# Patient Record
Sex: MALE | Race: WHITE | NOT HISPANIC OR LATINO | Employment: FULL TIME | URBAN - METROPOLITAN AREA
[De-identification: names, ages, dates, MRNs, and addresses within clinical notes are randomized per-mention and may not be internally consistent; named-entity substitution may affect disease eponyms.]

---

## 2017-03-06 ENCOUNTER — SLEEP CENTER VISIT (OUTPATIENT)
Dept: SLEEP MEDICINE | Facility: MEDICAL CENTER | Age: 57
End: 2017-03-06
Payer: COMMERCIAL

## 2017-03-06 VITALS
RESPIRATION RATE: 15 BRPM | WEIGHT: 214 LBS | TEMPERATURE: 97.5 F | HEIGHT: 70 IN | HEART RATE: 58 BPM | BODY MASS INDEX: 30.64 KG/M2 | SYSTOLIC BLOOD PRESSURE: 120 MMHG | DIASTOLIC BLOOD PRESSURE: 80 MMHG

## 2017-03-06 DIAGNOSIS — G47.33 OSA (OBSTRUCTIVE SLEEP APNEA): ICD-10-CM

## 2017-03-06 PROCEDURE — 99213 OFFICE O/P EST LOW 20 MIN: CPT | Performed by: NURSE PRACTITIONER

## 2017-03-06 RX ORDER — MELOXICAM 15 MG/1
TABLET ORAL
COMMUNITY
Start: 2017-01-16 | End: 2019-06-28

## 2017-03-06 NOTE — PATIENT INSTRUCTIONS
1. Continue CPAP nightly  2. Clean mask and tubing weekly  3. Replace mask and supplies as insurance will allow, order for new supplies to [facility]  4. Follow-up annually

## 2017-03-06 NOTE — PROGRESS NOTES
Chief Complaint   Patient presents with   • Follow-Up     1Y         HPI: This patient is a 56 y.o. male, who presents for annual follow-up of CHASITY. PSG indicates moderate CHASITY with an AHI of 25.7, minimum saturation 83%. Titration study December 2014 showed successful titration to CPAP. Patient is compliant with CPAP 10-16 cm H2O. Compliance download indicates 100% compliance, average use 9 hours per night, AHI of 13. He tells me the only time he gets up at night is when his cat wakes him up. Prior download showed an AHI of 31. He has had 2 titration studies indicating adequate titration to CPAP. He continues to feel well. Denies daytime hypersomnolence or morning headaches. Mask and pressures are comfortable. He denies snoring, resuscitative gasps or snorts. He gets supplies regularly from [facility]. No significant changes to his health since he was last seen.    Past Medical History   Diagnosis Date   • Allergy    • Dyslipidemia    • Hypertension    • Sleep apnea    • Chronic pain syndrome        Social History   Substance Use Topics   • Smoking status: Never Smoker    • Smokeless tobacco: Not on file   • Alcohol Use: Not on file       Family History   Problem Relation Age of Onset   • Other Father      leukemia   • Colon Cancer Mother    • Hypertension Brother    • Other Brother      dyslipidemia       Current medications as of today   Current Outpatient Prescriptions   Medication Sig Dispense Refill   • meloxicam (MOBIC) 15 MG tablet      • clonazepam (KLONOPIN) 1 MG TABS Take 1 mg by mouth every day.       • simvastatin (ZOCOR) 10 MG TABS Take 10 mg by mouth every evening.       • hydrocodone/acetaminophen (NORCO)  MG TABS Take 1 Tab by mouth every 6 hours as needed for Mild Pain. No driving or operation of machinery after taking this medication. 15 Each 0   • verapamil SR (CALAN-SR) 240 MG TBCR Take 240 mg by mouth every day.     • celecoxib (CELEBREX) 100 MG CAPS Take 100 mg by mouth as needed.         No  "current facility-administered medications for this visit.       Allergies: Erythromycin; Other food; and Sudafed    Blood pressure 120/80, pulse 58, temperature 36.4 °C (97.5 °F), temperature source Temporal, resp. rate 15, height 1.778 m (5' 10\"), weight 97.07 kg (214 lb).      ROS:   Constitutional: Denies fevers, chills, night sweats, weight loss or fatigue  HEENT: Denies earache, difficulty hearing, tinnitus, nasal congestion, hoarseness  Cardiovascular: Denies chest pain, tightness, palpitations, orthopnea or edema  Respiratory: See HPI  Sleep: Denies daytime sleepiness, snoring, apneas, insomnia, morning headaches  GI: Denies heartburn, dysphagia, nausea, abdominal pain, diarrhea or constipation  : Denies frequent urination, hematuria, discharge or painful urination    Physical exam:   Appearance: Well-nourished, well-developed, in no acute distress  HEENT: Normocephalic, atraumatic, white sclera, PERRLA, oropharynx clear  Respiratory: no intercostal retractions or accessory muscle use   Lungs auscultation: Clear to auscultation bilaterally  Cardiovascular: Regular rate rhythm no murmurs, rubs or gallops  Gait: Normal  Digits: No clubbing, cyanosis  Motor: No focal deficits  Orientation: Oriented to time, person and place    Diagnosis:  1. CHASITY (obstructive sleep apnea)  DME MASK AND SUPPLIES   2. BMI 30.0-30.9,adult         Plan:      1. Continue CPAP nightly  2. Clean mask and tubing weekly  3. Replace mask and supplies as insurance will allow, order for new supplies to [facility]  4. Follow-up annually            "

## 2017-03-06 NOTE — MR AVS SNAPSHOT
"        Mere Monge   3/6/2017 8:20 AM   Sleep Center Visit   MRN: [id number]    Department:  [facility]   Dept Phone:  [phone]    Description:  Male : 1960   Provider:  SCHUYLER Nagel           Reason for Visit     Follow-Up 1Y      Allergies as of 3/6/2017     Allergen Noted Reactions    Erythromycin 2012   Vomiting    Other Food 10/17/2016       Walnuts      Sudafed [Pseudoephedrine Hcl] 2012   Palpitations    Palpitations.      Vital Signs     Blood Pressure Pulse Temperature Respirations Height Weight    120/80 mmHg 58 36.4 °C (97.5 °F) (Temporal) 15 1.778 m (5' 10\") 97.07 kg (214 lb)    Body Mass Index Smoking Status                30.71 kg/m2 Never Smoker           Basic Information     Date Of Birth Sex Race Ethnicity Preferred Language    1960 Male White Non- English      Your appointments     Mar 06, 2018  9:00 AM   Follow UP with SCHUYLER Nagel   [facility] (--)    [address]  [facility] [address]   [phone]              Health Maintenance        Date Due Completion Dates    COLONOSCOPY 2008 ---    IMM INFLUENZA (1) 2016 ---    IMM DTaP/Tdap/Td Vaccine (2 - Td) 10/26/2024 10/26/2014            Current Immunizations     Tdap Vaccine 10/26/2014  2:59 PM      Below and/or attached are the medications your provider expects you to take. Review all of your home medications and newly ordered medications with your provider and/or pharmacist. Follow medication instructions as directed by your provider and/or pharmacist. Please keep your medication list with you and share with your provider. Update the information when medications are discontinued, doses are changed, or new medications (including over-the-counter products) are added; and carry medication information at all times in the event of emergency situations     Allergies:  ERYTHROMYCIN - Vomiting     OTHER FOOD - (reactions " not documented)     SUDAFED - Palpitations               Medications  Valid as of: March 06, 2017 -  9:12 AM    Generic Name Brand Name Tablet Size Instructions for use    Celecoxib (Cap) CELEBREX 100 MG Take 100 mg by mouth as needed.          ClonazePAM (Tab) KLONOPIN 1 MG Take 1 mg by mouth every day.          Hydrocodone-Acetaminophen (Tab) NORCO  MG Take 1 Tab by mouth every 6 hours as needed for Mild Pain. No driving or operation of machinery after taking this medication.        Meloxicam (Tab) MOBIC 15 MG         Simvastatin (Tab) ZOCOR 10 MG Take 10 mg by mouth every evening.          Verapamil HCl (Tab CR) CALAN- MG Take 240 mg by mouth every day.        .                 Medicines prescribed today were sent to:     [facility] #[id number] - [place], [place] - [address]    [address] [place] [address]    Phone: [phone] Fax: [phone]    Open 24 Hours?: No      Medication refill instructions:       If your prescription bottle indicates you have medication refills left, it is not necessary to call your provider’s office. Please contact your pharmacy and they will refill your medication.    If your prescription bottle indicates you do not have any refills left, you may request refills at any time through one of the following ways: The online TouchPo Android POS system (except Urgent Care), by calling your provider’s office, or by asking your pharmacy to contact your provider’s office with a refill request. Medication refills are processed only during regular business hours and may not be available until the next business day. Your provider may request additional information or to have a follow-up visit with you prior to refilling your medication.   *Please Note: Medication refills are assigned a new Rx number when refilled electronically. Your pharmacy may indicate that no refills were authorized even though a new prescription for the same medication is available at the pharmacy.  Please request the medicine by name with the pharmacy before contacting your provider for a refill.           Portalarium Access Code: [email or web address]  Expires: 4/5/2017  9:12 AM    Portalarium  A secure, online tool to manage your health information     [employer]’s Portalarium® is a secure, online tool that connects you to your personalized health information from the privacy of your home -- day or night - making it very easy for you to manage your healthcare. Once the activation process is completed, you can even access your medical information using the Portalarium charline, which is available for free in the Apple Charline store or Google Play store.     Portalarium provides the following levels of access (as shown below):   My Chart Features   [facility] Doctor [facility]  Specialists [facility]  Urgent  Care [facility]  Primary Care  Doctor   Email your healthcare team securely and privately 24/7 X X X    Manage appointments: schedule your next appointment; view details of past/upcoming appointments X      Request prescription refills. X      View recent personal medical records, including lab and immunizations X X X X   View health record, including health history, allergies, medications X X X X   Read reports about your outpatient visits, procedures, consult and ER notes X X X X   See your discharge summary, which is a recap of your hospital and/or ER visit that includes your diagnosis, lab results, and care plan. X X       How to register for Portalarium:  1. Go to  [email or web address].  2. Click on the Sign Up Now box, which takes you to the New Member Sign Up page. You will need to provide the following information:  a. Enter your Portalarium Access Code exactly as it appears at the top of this page. (You will not need to use this code after you’ve completed the sign-up process. If you do not sign up before the expiration date, you must request a new code.)   b. Enter your date of birth.   c. Enter your home email address.    d. Click Submit, and follow the next screen’s instructions.  3. Create a ImmunotEGGt ID. This will be your [employer]ot[employer] login ID and cannot be changed, so think of one that is secure and easy to remember.  4. Create a [employer]ot[employer] password. You can change your password at any time.  5. Enter your Password Reset Question and Answer. This can be used at a later time if you forget your password.   6. Enter your e-mail address. This allows you to receive e-mail notifications when new information is available in [employer].  7. Click Sign Up. You can now view your health information.    For assistance activating your [employer] account, call [phone]

## 2018-03-08 ENCOUNTER — SLEEP CENTER VISIT (OUTPATIENT)
Dept: SLEEP MEDICINE | Facility: MEDICAL CENTER | Age: 58
End: 2018-03-08
Payer: COMMERCIAL

## 2018-03-08 VITALS
HEIGHT: 70 IN | DIASTOLIC BLOOD PRESSURE: 70 MMHG | HEART RATE: 56 BPM | BODY MASS INDEX: 31.5 KG/M2 | RESPIRATION RATE: 15 BRPM | OXYGEN SATURATION: 95 % | WEIGHT: 220 LBS | SYSTOLIC BLOOD PRESSURE: 128 MMHG

## 2018-03-08 DIAGNOSIS — G47.33 OSA (OBSTRUCTIVE SLEEP APNEA): ICD-10-CM

## 2018-03-08 PROCEDURE — 99213 OFFICE O/P EST LOW 20 MIN: CPT | Performed by: NURSE PRACTITIONER

## 2018-03-08 NOTE — PROGRESS NOTES
Chief Complaint   Patient presents with   • Follow-Up     ANNUAL          HPI: This patient is a 57 y.o. male, who presents for annual follow-up of obstructive sleep apnea with compliance download.     PSG indicates moderate obstructive sleep apnea with an AHI of 25.7, minimum oxygen saturation of 83 %. He has had 2 titrations in the past showing adequate treatment with CPAP, despite mildly elevated AHI on download. He is compliant with auto CPAP 10-16 cm H2O. Compliance download over the past 30 days indicates 100 % compliance, average use of 9 hours per night, AHI of 15. Mask and pressures are comfortable. He requires new supplies. He reports improved quality of sleep with CPAP. Denies EDS or a.m. Headache.    Denies changes to his health over the past year.    Past Medical History:   Diagnosis Date   • Allergy    • Chronic pain syndrome    • Dyslipidemia    • Hypertension    • Sleep apnea        Social History   Substance Use Topics   • Smoking status: Never Smoker   • Smokeless tobacco: Never Used   • Alcohol use Yes      Comment: VERY RARE       Family History   Problem Relation Age of Onset   • Other Father      leukemia   • Colon Cancer Mother    • Hypertension Brother    • Other Brother      dyslipidemia   • Sleep Apnea Neg Hx        Current medications as of today   Current Outpatient Prescriptions   Medication Sig Dispense Refill   • clonazepam (KLONOPIN) 1 MG TABS Take 1 mg by mouth every day.       • simvastatin (ZOCOR) 10 MG TABS Take 10 mg by mouth every evening.       • hydrocodone/acetaminophen (NORCO)  MG TABS Take 1 Tab by mouth every 6 hours as needed for Mild Pain. No driving or operation of machinery after taking this medication. 15 Each 0   • verapamil SR (CALAN-SR) 240 MG TBCR Take 240 mg by mouth every day.     • meloxicam (MOBIC) 15 MG tablet      • celecoxib (CELEBREX) 100 MG CAPS Take 100 mg by mouth as needed.         No current facility-administered medications for this visit.   "      Allergies: Erythromycin; Other food; and Sudafed [pseudoephedrine hcl]    Blood pressure 128/70, pulse (!) 56, resp. rate 15, height 1.778 m (5' 10\"), weight 99.8 kg (220 lb), SpO2 95 %.      ROS: As per HPI and otherwise negative if not stated.      Physical exam:   Constitutional: Well-nourished, well-developed, in no acute distress  Eyes: PERRL  Neck: supple, trachea midline  Respiratory: no intercostal retractions or accessory muscle use   Lungs auscultation: Clear to auscultation bilaterally  Cardiovascular: Regular rate rhythm no murmurs, rubs or gallops  Musculoskeletal: no clubbing or cyanosis  Skin: No rashes or lesions noted on exposed skin  Neuro: No focal deficit noted  Psychiatric: Oriented to time, person and place.     Diagnosis:  1. CHASITY (obstructive sleep apnea)  DME MASK AND SUPPLIES   2. BMI 30.0-30.9,adult         Plan:  1. Continue auto CPAP nightly  2. Order for new mask and supplies to [facility]  3. Clean mask & tubing weekly  4. Replace supplies as insurance will allow  5. Follow up annually, sooner if needed    "

## 2018-09-11 ENCOUNTER — TELEPHONE (OUTPATIENT)
Dept: SLEEP MEDICINE | Facility: MEDICAL CENTER | Age: 58
End: 2018-09-11

## 2018-09-11 DIAGNOSIS — G47.33 OSA (OBSTRUCTIVE SLEEP APNEA): ICD-10-CM

## 2018-09-11 NOTE — TELEPHONE ENCOUNTER
Pt was seen 03/08/18 and his AutoPAP is broken, please sign pended order for a new pap to be sent to [facility]ap

## 2019-01-17 ENCOUNTER — APPOINTMENT (RX ONLY)
Dept: URBAN - METROPOLITAN AREA CLINIC 35 | Facility: CLINIC | Age: 59
Setting detail: DERMATOLOGY
End: 2019-01-17

## 2019-01-17 DIAGNOSIS — D22 MELANOCYTIC NEVI: ICD-10-CM

## 2019-01-17 DIAGNOSIS — Z71.89 OTHER SPECIFIED COUNSELING: ICD-10-CM

## 2019-01-17 DIAGNOSIS — D18.0 HEMANGIOMA: ICD-10-CM

## 2019-01-17 DIAGNOSIS — L82.1 OTHER SEBORRHEIC KERATOSIS: ICD-10-CM

## 2019-01-17 DIAGNOSIS — L81.4 OTHER MELANIN HYPERPIGMENTATION: ICD-10-CM

## 2019-01-17 DIAGNOSIS — Q83.3 ACCESSORY NIPPLE: ICD-10-CM

## 2019-01-17 PROBLEM — D22.61 MELANOCYTIC NEVI OF RIGHT UPPER LIMB, INCLUDING SHOULDER: Status: ACTIVE | Noted: 2019-01-17

## 2019-01-17 PROBLEM — D18.01 HEMANGIOMA OF SKIN AND SUBCUTANEOUS TISSUE: Status: ACTIVE | Noted: 2019-01-17

## 2019-01-17 PROBLEM — I10 ESSENTIAL (PRIMARY) HYPERTENSION: Status: ACTIVE | Noted: 2019-01-17

## 2019-01-17 PROCEDURE — 99213 OFFICE O/P EST LOW 20 MIN: CPT

## 2019-01-17 PROCEDURE — ? COUNSELING

## 2019-01-17 ASSESSMENT — LOCATION ZONE DERM
LOCATION ZONE: ARM
LOCATION ZONE: TRUNK

## 2019-01-17 ASSESSMENT — LOCATION SIMPLE DESCRIPTION DERM
LOCATION SIMPLE: ABDOMEN
LOCATION SIMPLE: RIGHT SHOULDER

## 2019-01-17 ASSESSMENT — LOCATION DETAILED DESCRIPTION DERM
LOCATION DETAILED: RIGHT POSTERIOR SHOULDER
LOCATION DETAILED: RIGHT RIB CAGE

## 2019-05-02 ENCOUNTER — SLEEP CENTER VISIT (OUTPATIENT)
Dept: SLEEP MEDICINE | Facility: MEDICAL CENTER | Age: 59
End: 2019-05-02
Payer: COMMERCIAL

## 2019-05-02 VITALS
RESPIRATION RATE: 16 BRPM | HEIGHT: 70 IN | OXYGEN SATURATION: 95 % | SYSTOLIC BLOOD PRESSURE: 130 MMHG | DIASTOLIC BLOOD PRESSURE: 70 MMHG | BODY MASS INDEX: 31.04 KG/M2 | HEART RATE: 52 BPM | WEIGHT: 216.8 LBS

## 2019-05-02 DIAGNOSIS — R01.1 MURMUR, CARDIAC: ICD-10-CM

## 2019-05-02 DIAGNOSIS — G47.33 OSA (OBSTRUCTIVE SLEEP APNEA): ICD-10-CM

## 2019-05-02 DIAGNOSIS — R06.02 SOB (SHORTNESS OF BREATH): ICD-10-CM

## 2019-05-02 PROCEDURE — 99214 OFFICE O/P EST MOD 30 MIN: CPT | Performed by: NURSE PRACTITIONER

## 2019-05-02 ASSESSMENT — ENCOUNTER SYMPTOMS
EYE PAIN: 0
MUSCULOSKELETAL NEGATIVE: 1
WHEEZING: 0
NEUROLOGICAL NEGATIVE: 1
PSYCHIATRIC NEGATIVE: 1
GASTROINTESTINAL NEGATIVE: 1
CARDIOVASCULAR NEGATIVE: 1
BRUISES/BLEEDS EASILY: 0
CONSTITUTIONAL NEGATIVE: 1
HEMOPTYSIS: 0
SPUTUM PRODUCTION: 0
EYE DISCHARGE: 0
SHORTNESS OF BREATH: 1
COUGH: 0

## 2019-05-02 NOTE — PROGRESS NOTES
Chief Complaint   Patient presents with   • Apnea     Last Seen 03/08/18         HPI: This patient is a 58 y.o. male, who presents for annual follow-up of obstructive sleep apnea.      PSG 2010 indicates moderate obstructive sleep apnea with an AHI of 25.7, minimum oxygen saturation of 83 %. He has had 2 titrations in the past showing adequate treatment with CPAP, despite elevated AHI on download. His OPO showed adequate saturations on CPAP. He is compliant with auto CPAP 10-16 cm H2O.  His last titration was in 2014 and he has been hesitant to repeat this.  He did not take Ambien during his prior sleep studies and reports a poor quality.  Upon further review of that titration study in 2014 his sleep efficiency was reduced to 67% and he only achieved one brief period of REM.     His CPAP device broke in September and a new one was ordered.  He did not bring his compliance chip today.  He continues to feel well rested and benefits from therapy.  He request a prescription for supplies.  He brings me documentation today of his daily AHI since September 27, 2018 when he received his new CPAP machine.  Some nights his AHI is low in the 3-4 range.  This typically occurs when he is traveling at lower altitude such as [facility] and [facility].  Although he has had several nights in [place] with a low AHI.  Other nights shows an elevated AHI at the highest 37.  He denies any changes to medications he denies mask leak, he is using a full facemask, he denies any narcotics or alcohol use.  He continues to deny significant sleep symptoms.  He feels he falls asleep quickly stays asleep through the night, rarely gets up to use the bathroom.  Denies EDS or a.m. Headache.    His main complaint today is shortness of breath.  He became ill with a cold in November.  Since that time he says he never fully recovered.  He continues to feel short of breath and gets tired more quickly than he used to with minimal activity.  He is a  never smoker.  No history of COPD or asthma.  He has never used inhalers.    Past Medical History:   Diagnosis Date   • Allergy    • Chronic pain syndrome    • Dyslipidemia    • Hypertension    • Sleep apnea        Social History   Substance Use Topics   • Smoking status: Never Smoker   • Smokeless tobacco: Never Used   • Alcohol use Yes      Comment: VERY RARE       Family History   Problem Relation Age of Onset   • Other Father         leukemia   • Colon Cancer Mother    • Hypertension Brother    • Other Brother         dyslipidemia   • Sleep Apnea Neg Hx        Immunization History   Administered Date(s) Administered   • Influenza TIV (IM) 10/01/2017   • Tdap Vaccine 10/26/2014       Current medications as of today   Current Outpatient Prescriptions   Medication Sig Dispense Refill   • meloxicam (MOBIC) 15 MG tablet      • clonazepam (KLONOPIN) 1 MG TABS Take 1 mg by mouth every day.       • simvastatin (ZOCOR) 10 MG TABS Take 10 mg by mouth every evening.       • celecoxib (CELEBREX) 100 MG CAPS Take 100 mg by mouth as needed.       • hydrocodone/acetaminophen (NORCO)  MG TABS Take 1 Tab by mouth every 6 hours as needed for Mild Pain. No driving or operation of machinery after taking this medication. 15 Each 0   • verapamil SR (CALAN-SR) 240 MG TBCR Take 240 mg by mouth every day.       No current facility-administered medications for this visit.        Allergies: Erythromycin; Other food; and Sudafed [pseudoephedrine hcl]    There were no vitals taken for this visit.      Review of Systems   Constitutional: Negative.    HENT: Negative.    Eyes: Negative for pain and discharge.   Respiratory: Positive for shortness of breath. Negative for cough, hemoptysis, sputum production and wheezing.    Cardiovascular: Negative.    Gastrointestinal: Negative.    Musculoskeletal: Negative.    Skin: Negative.    Neurological: Negative.    Endo/Heme/Allergies: Negative for environmental allergies. Does not bruise/bleed  easily.   Psychiatric/Behavioral: Negative.        Physical Exam   Constitutional: He is oriented to person, place, and time and well-developed, well-nourished, and in no distress.   HENT:   Head: Normocephalic and atraumatic.   Eyes: Pupils are equal, round, and reactive to light.   Neck: Normal range of motion. Neck supple. No tracheal deviation present.   Cardiovascular: Normal rate, regular rhythm and normal heart sounds.    Pulmonary/Chest: Effort normal and breath sounds normal. No respiratory distress. He has no wheezes. He has no rales.   Musculoskeletal: Normal range of motion. He exhibits no edema.   Neurological: He is alert and oriented to person, place, and time.   Skin: Skin is warm and dry.   Psychiatric: Mood, memory, affect and judgment normal.       Diagnoses/Plan:    1. CHASITY (obstructive sleep apnea)  Patient's initial sleep study showed moderate sleep apnea.  He has been on CPAP therapy for many years.  His AHI remains elevated on some nights, normal and other nights.  Prior overnight oximetry showed adequate nocturnal saturations, I suspect this may have been on one of his better nights of sleep.  He continues to deny significant sleep symptoms.  For his cardiovascular health, I have reiterated the importance that his sleep apnea is adequately treated.  I have strongly recommended an additional titration study with the use of Ambien.  I suspect he is having increased events during REM sleep and may require either BiPAP or ASV therapy.  Alternatively, we discussed repeating overnight oximetry to verify saturations.  He is agreeable to this.  If there is evidence of desaturations he would be amenable to additional titration.  - Overnight Oximetry; Future    2. SOB (shortness of breath)  In regards to shortness of breath, symptoms have been ongoing since November.  He reports intermittent wheeze and exertional dyspnea.  No formal history of lung disease.  I recommended pulmonary function testing and  chest x-ray to rule out lung disease.  He may have some element of reactive airways disease since his URI in November.  I have also recommended echocardiogram to verify pulmonary pressures.  - PULMONARY FUNCTION TESTS -Test requested: Complete Pulmonary Function Test; Future  - DX-CHEST-2 VIEWS  - EC-ECHOCARDIOGRAM COMPLETE W/O CONT; Future    3. Murmur, cardiac  He has a history of cardiac murmur, prior echo in 2015 showed mild concentric left ventricular hypertrophy, normal LVEF, trace mitral regurg, RVSP consistent with mild pulmonary hypertension.  Pending updated echocardiogram consider referral to cardiology for further work-up of shortness of breath.  - EC-ECHOCARDIOGRAM COMPLETE W/O CONT; Future    I would like for him to follow-up with the pulmonary clinic to review PFTs, echocardiogram, overnight oximetry, and x-ray    This dictation was created using voice recognition software. The accuracy of the dictation is limited to the abilities of the software. I expect there may be some errors of grammar and possibly content.

## 2019-05-09 ENCOUNTER — HOME STUDY (OUTPATIENT)
Dept: SLEEP MEDICINE | Facility: MEDICAL CENTER | Age: 59
End: 2019-05-09
Attending: NURSE PRACTITIONER
Payer: COMMERCIAL

## 2019-05-09 ENCOUNTER — HOSPITAL ENCOUNTER (OUTPATIENT)
Dept: RADIOLOGY | Facility: MEDICAL CENTER | Age: 59
End: 2019-05-09
Attending: NURSE PRACTITIONER
Payer: COMMERCIAL

## 2019-05-09 DIAGNOSIS — G47.33 OSA (OBSTRUCTIVE SLEEP APNEA): ICD-10-CM

## 2019-05-09 PROCEDURE — 94762 N-INVAS EAR/PLS OXIMTRY CONT: CPT | Performed by: FAMILY MEDICINE

## 2019-05-09 PROCEDURE — 71046 X-RAY EXAM CHEST 2 VIEWS: CPT

## 2019-05-10 ENCOUNTER — PATIENT MESSAGE (OUTPATIENT)
Dept: SLEEP MEDICINE | Facility: MEDICAL CENTER | Age: 59
End: 2019-05-10

## 2019-05-10 DIAGNOSIS — R00.1 BRADYCARDIA: ICD-10-CM

## 2019-05-10 NOTE — PROCEDURES
Over Night Pulse Oximetry     Indication:To assess the efficacy of the current pressure.       Impression:   The study was done on CPAP 10-16 cm. The total recording time was 6 hrs 13 min. O2 Sat. irvin was 82% and mean O2 sat was 91 % and baseline O2 at 93%. O2 sat was below 88% for 6 min of the flow evaluation time. Oxygen Desaturation (>=3%) Index was elevated at 5.8/hr.      Recommendation:  O2 irvin seems like an artifact. Unremarkable OPO otherwise. Continue CPAP at current pressure.

## 2019-05-10 NOTE — TELEPHONE ENCOUNTER
From: Mere Monge  To: SCHUYLER Nagel  Sent: 5/10/2019 10:26 AM PDT  Subject: Test Result Question    Madeleine, I had a moderate number of 17.5 events per hour last night when I was wearing the recording Oximeter.

## 2019-05-28 ENCOUNTER — APPOINTMENT (OUTPATIENT)
Dept: PULMONOLOGY | Facility: HOSPICE | Age: 59
End: 2019-05-28
Payer: COMMERCIAL

## 2019-05-29 ENCOUNTER — HOSPITAL ENCOUNTER (OUTPATIENT)
Dept: CARDIOLOGY | Facility: MEDICAL CENTER | Age: 59
End: 2019-05-29
Attending: NURSE PRACTITIONER
Payer: COMMERCIAL

## 2019-05-29 DIAGNOSIS — R06.02 SOB (SHORTNESS OF BREATH): ICD-10-CM

## 2019-05-29 DIAGNOSIS — R01.1 MURMUR, CARDIAC: ICD-10-CM

## 2019-05-29 LAB
LV EJECT FRACT  99904: 70
LV EJECT FRACT MOD 2C 99903: 52.21
LV EJECT FRACT MOD 4C 99902: 79.21
LV EJECT FRACT MOD BP 99901: 67.65

## 2019-05-29 PROCEDURE — 93306 TTE W/DOPPLER COMPLETE: CPT

## 2019-05-29 PROCEDURE — 93306 TTE W/DOPPLER COMPLETE: CPT | Mod: 26 | Performed by: INTERNAL MEDICINE

## 2019-06-03 ENCOUNTER — OFFICE VISIT (OUTPATIENT)
Dept: PULMONOLOGY | Facility: HOSPICE | Age: 59
End: 2019-06-03
Payer: COMMERCIAL

## 2019-06-03 ENCOUNTER — NON-PROVIDER VISIT (OUTPATIENT)
Dept: PULMONOLOGY | Facility: HOSPICE | Age: 59
End: 2019-06-03
Attending: NURSE PRACTITIONER
Payer: COMMERCIAL

## 2019-06-03 VITALS
RESPIRATION RATE: 16 BRPM | BODY MASS INDEX: 31.7 KG/M2 | DIASTOLIC BLOOD PRESSURE: 60 MMHG | HEART RATE: 46 BPM | HEIGHT: 69 IN | SYSTOLIC BLOOD PRESSURE: 120 MMHG | OXYGEN SATURATION: 98 % | WEIGHT: 214 LBS

## 2019-06-03 VITALS — HEIGHT: 69 IN | BODY MASS INDEX: 31.7 KG/M2 | WEIGHT: 214 LBS

## 2019-06-03 DIAGNOSIS — R06.02 SOB (SHORTNESS OF BREATH): ICD-10-CM

## 2019-06-03 DIAGNOSIS — R00.1 BRADYCARDIA: ICD-10-CM

## 2019-06-03 DIAGNOSIS — G47.33 OSA (OBSTRUCTIVE SLEEP APNEA): ICD-10-CM

## 2019-06-03 DIAGNOSIS — J68.3 REACTIVE AIRWAYS DYSFUNCTION SYNDROME (HCC): ICD-10-CM

## 2019-06-03 PROCEDURE — 99214 OFFICE O/P EST MOD 30 MIN: CPT | Performed by: NURSE PRACTITIONER

## 2019-06-03 PROCEDURE — 94060 EVALUATION OF WHEEZING: CPT | Performed by: INTERNAL MEDICINE

## 2019-06-03 PROCEDURE — 94726 PLETHYSMOGRAPHY LUNG VOLUMES: CPT | Performed by: INTERNAL MEDICINE

## 2019-06-03 PROCEDURE — 94729 DIFFUSING CAPACITY: CPT | Performed by: INTERNAL MEDICINE

## 2019-06-03 RX ORDER — PREDNISONE 10 MG/1
TABLET ORAL
Qty: 18 TAB | Refills: 2 | Status: CANCELLED | OUTPATIENT
Start: 2019-06-03

## 2019-06-03 ASSESSMENT — ENCOUNTER SYMPTOMS
NEUROLOGICAL NEGATIVE: 1
MUSCULOSKELETAL NEGATIVE: 1
BRUISES/BLEEDS EASILY: 0
CONSTITUTIONAL NEGATIVE: 1
SHORTNESS OF BREATH: 1
COUGH: 0
HEMOPTYSIS: 0
PSYCHIATRIC NEGATIVE: 1
EYE PAIN: 0
CARDIOVASCULAR NEGATIVE: 1
SPUTUM PRODUCTION: 0
EYE DISCHARGE: 0
WHEEZING: 0
GASTROINTESTINAL NEGATIVE: 1

## 2019-06-03 ASSESSMENT — PULMONARY FUNCTION TESTS
FEV1/FVC_PERCENT_PREDICTED: 105
FEV1/FVC: 81
FEV1_PERCENT_PREDICTED: 89
FEV1/FVC_PERCENT_PREDICTED: 105
FVC: 3.92
FEV1/FVC_PERCENT_CHANGE: 200
FEV1/FVC_PERCENT_PREDICTED: 107
FVC: 4.06
FEV1_PERCENT_PREDICTED: 95
FVC_PERCENT_PREDICTED: 85
FEV1/FVC_PERCENT_PREDICTED: 108
FEV1/FVC_PERCENT_LLN: 63
FEV1/FVC: 81.03
FVC_LLN: 3.82
FEV1/FVC: 79
FEV1_PERCENT_CHANGE: 3
FVC_PERCENT_PREDICTED: 88
FVC_PREDICTED: 4.57
FEV1_PREDICTED: 3.45
FEV1: 3.1
FEV1/FVC_PERCENT_PREDICTED: 75
FEV1/FVC: 79
FEV1/FVC_PERCENT_CHANGE: 2
FEV1_PERCENT_CHANGE: 6
FEV1/FVC_PREDICTED: 75
FEV1_LLN: 2.88
FEV1: 3.29

## 2019-06-03 NOTE — PROGRESS NOTES
Chief Complaint   Patient presents with   • Asthma     Last Seen 05/02/19   • Results     PFT 06/03/19, CXR 05/09/19, Echo 05/29/19, CNOX 05/09/19         HPI: This patient is a 59 y.o. male, who presents for test results and follow-up SOB.  He is normally seen at the sleep clinic for history of CHASITY.  Please see my note dated May 2, 2019 for details.  His main complaint at the time of his last visit was shortness of breath with exertion.  He became ill with a cold in November.  Since that time he says he never fully recovered.  He continues to feel short of breath and gets tired more quickly than he used to with minimal activity.  He denies cough or wheeze.  Denies chest pain or pressure.  Shortness of breath is primarily with stairs.  He noticed decreased stamina when skiing this winter. he is a never smoker.  No history of COPD or asthma. He has never used inhalers.    Echocardiogram was unremarkable with the exception of mild pulmonary hypertension, LVEF of 70% and RVSP 30 mmHg.  Chest x-ray is unremarkable, lungs are clear.  PFTs today are normal with an FEV1 of 3.10 L 89% predicted, FEV1 FVC ratio 79, TLC 98, DLCO 126% predicted with no significant bronchodilator response.  Testing reviewed in detail with the patient.  Of note heart rate is 46 today.  He takes verapamil 240 mg daily for hypertension.  He says his heart heart rate always runs low.  Even when he is exercising he cannot get his heart rate above 70.    Past Medical History:   Diagnosis Date   • Allergy    • Chronic pain syndrome    • Dyslipidemia    • Hypertension    • Sleep apnea        Social History   Substance Use Topics   • Smoking status: Never Smoker   • Smokeless tobacco: Never Used   • Alcohol use Yes      Comment: VERY RARE       Family History   Problem Relation Age of Onset   • Other Father         leukemia   • Colon Cancer Mother    • Hypertension Brother    • Other Brother         dyslipidemia   • Sleep Apnea Neg Hx   "      Immunization History   Administered Date(s) Administered   • Influenza (IM) Preservative Free 10/08/2010, 10/19/2012   • Influenza Seasonal Injectable 10/19/2011, 09/21/2013, 10/14/2014   • Influenza TIV (IM) 10/01/2017   • Influenza Vaccine Quad Inj (Pf) 10/06/2015   • Influenza Vaccine Quad Inj (Preserved) 11/14/2016   • Tdap Vaccine 10/26/2014       Current medications as of today   Current Outpatient Prescriptions   Medication Sig Dispense Refill   • clonazepam (KLONOPIN) 1 MG TABS Take 1 mg by mouth every day.       • simvastatin (ZOCOR) 10 MG TABS Take 10 mg by mouth every evening.       • hydrocodone/acetaminophen (NORCO)  MG TABS Take 1 Tab by mouth every 6 hours as needed for Mild Pain. No driving or operation of machinery after taking this medication. 15 Each 0   • verapamil SR (CALAN-SR) 240 MG TBCR Take 240 mg by mouth every day.     • meloxicam (MOBIC) 15 MG tablet      • celecoxib (CELEBREX) 100 MG CAPS Take 100 mg by mouth as needed.         No current facility-administered medications for this visit.        Allergies: Erythromycin; Other food; and Sudafed [pseudoephedrine hcl]    Ht 1.753 m (5' 9\")   Wt 97.1 kg (214 lb)       Review of Systems   Constitutional: Negative.    HENT: Negative.    Eyes: Negative for pain and discharge.   Respiratory: Positive for shortness of breath. Negative for cough, hemoptysis, sputum production and wheezing.    Cardiovascular: Negative.    Gastrointestinal: Negative.    Musculoskeletal: Negative.    Skin: Negative.    Neurological: Negative.    Endo/Heme/Allergies: Negative for environmental allergies. Does not bruise/bleed easily.   Psychiatric/Behavioral: Negative.        Physical Exam   Constitutional: He is oriented to person, place, and time and well-developed, well-nourished, and in no distress.   HENT:   Head: Normocephalic and atraumatic.   Mouth/Throat: Oropharynx is clear and moist.   Eyes: Pupils are equal, round, and reactive to light. "   Neck: Normal range of motion. Neck supple. No tracheal deviation present.   Cardiovascular: Regular rhythm and normal heart sounds.  Exam reveals no friction rub.    No murmur heard.  Bradycardic   Pulmonary/Chest: Effort normal and breath sounds normal.   Musculoskeletal: Normal range of motion.   Neurological: He is alert and oriented to person, place, and time. Gait normal.   Skin: Skin is warm and dry.   Psychiatric: Mood, memory, affect and judgment normal.       Diagnoses/Plan:    Reviewed PFTs chest x-ray and echo with Dr. Tadeo.  He is in agreement with trial of albuterol.  Patient does not have significant lung disease.    1. SOB (shortness of breath)  Pulmonary function tests, chest x-ray and echocardiogram are otherwise unremarkable.  He is noted to have bradycardia and he reports this is been an ongoing issue.  Recommend Holter monitor and referral to cardiology for further evaluation.  I suspect his verapamil will need to be decreased.  Also recommended he discuss with his PCP.  - Holter Monitor Study; Future  - REFERRAL TO CARDIOLOGY    2. Bradycardia  - Holter Monitor Study; Future  - REFERRAL TO CARDIOLOGY    3. Reactive airways dysfunction syndrome (HCC)  Very mild bronchodilator response on PFTs.  We discussed trial of albuterol or prednisone taper.  We will hold off until further evaluation by cardiology as I suspect bradycardia is the culprit of SOB.    4. CHASITY (obstructive sleep apnea)  Continue CPAP therapy nightly.  Recent overnight oximetry shows adequate nocturnal saturations    Follow-up in 3 to 6 months, sooner if needed          This dictation was created using voice recognition software. The accuracy of the dictation is limited to the abilities of the software. I expect there may be some errors of grammar and possibly content.

## 2019-06-03 NOTE — PROCEDURES
Technician: KAREN Burgos    Technician Comment:  Good patient effort & cooperation.  The results of this test meet the ATS/ERS standards for acceptability & reproducibility.  Test was performed on the Flared3D Body Plethysmograph-Elite DX system.  Predicted values were [facility]-3 for spirometry, [facility] for DLCO, ITS for Lung Volumes.  The DLCO was uncorrected for Hgb.  A bronchodilator of Ventolin HFA -2puffs via spacer administered.  DLCO performed during dilation period.    1. Baseline spirometry demonstrates a normal  FEV1 and FVC. FEV1/FVC ratio is normal.    2. After administration of an inhaled bronchodilator there is 6% improvement in FEV1.    3. Lung volumes demonstrate a normal total lung capacity at 98% of predicted.    4. Gas exchange as estimated by DLCO is normal at 126% of predicted.    5. Airway resistance is mildly increased.      Impression:    This study demonstrates the presence of possible mild obstructive lung disease as evidenced by the minimal increase in FEV1 after an inhaled bronchodilator and the increased airway resistance.  Minimal reversibility is noted on the study.

## 2019-06-24 ENCOUNTER — TELEPHONE (OUTPATIENT)
Dept: CARDIOLOGY | Facility: MEDICAL CENTER | Age: 59
End: 2019-06-24

## 2019-06-24 NOTE — TELEPHONE ENCOUNTER
LVM for patient to call about upcoming appointment with [facility] 7-1-2019. I need to know if he has had a recent EKG, labs and if so where he had them done.

## 2019-06-28 ENCOUNTER — HOSPITAL ENCOUNTER (EMERGENCY)
Facility: MEDICAL CENTER | Age: 59
End: 2019-06-28
Attending: EMERGENCY MEDICINE
Payer: COMMERCIAL

## 2019-06-28 ENCOUNTER — APPOINTMENT (OUTPATIENT)
Dept: RADIOLOGY | Facility: MEDICAL CENTER | Age: 59
End: 2019-06-28
Attending: EMERGENCY MEDICINE
Payer: COMMERCIAL

## 2019-06-28 VITALS
HEIGHT: 69 IN | OXYGEN SATURATION: 96 % | TEMPERATURE: 98.1 F | DIASTOLIC BLOOD PRESSURE: 77 MMHG | BODY MASS INDEX: 31.54 KG/M2 | WEIGHT: 212.96 LBS | HEART RATE: 46 BPM | RESPIRATION RATE: 17 BRPM | SYSTOLIC BLOOD PRESSURE: 152 MMHG

## 2019-06-28 DIAGNOSIS — R10.32 LLQ PAIN: ICD-10-CM

## 2019-06-28 DIAGNOSIS — N20.1 URETEROLITHIASIS: ICD-10-CM

## 2019-06-28 DIAGNOSIS — R10.9 FLANK PAIN: ICD-10-CM

## 2019-06-28 DIAGNOSIS — R00.1 SINUS BRADYCARDIA: ICD-10-CM

## 2019-06-28 LAB
ALBUMIN SERPL BCP-MCNC: 4.1 G/DL (ref 3.2–4.9)
ALBUMIN/GLOB SERPL: 1.4 G/DL
ALP SERPL-CCNC: 91 U/L (ref 30–99)
ALT SERPL-CCNC: 42 U/L (ref 2–50)
ANION GAP SERPL CALC-SCNC: 8 MMOL/L (ref 0–11.9)
APPEARANCE UR: CLEAR
AST SERPL-CCNC: 35 U/L (ref 12–45)
BACTERIA #/AREA URNS HPF: ABNORMAL /HPF
BASOPHILS # BLD AUTO: 0.9 % (ref 0–1.8)
BASOPHILS # BLD: 0.06 K/UL (ref 0–0.12)
BILIRUB SERPL-MCNC: 0.7 MG/DL (ref 0.1–1.5)
BILIRUB UR QL STRIP.AUTO: NEGATIVE
BUN SERPL-MCNC: 15 MG/DL (ref 8–22)
CALCIUM SERPL-MCNC: 8.5 MG/DL (ref 8.4–10.2)
CHLORIDE SERPL-SCNC: 102 MMOL/L (ref 96–112)
CO2 SERPL-SCNC: 27 MMOL/L (ref 20–33)
COLOR UR: YELLOW
CREAT SERPL-MCNC: 1.11 MG/DL (ref 0.5–1.4)
EKG IMPRESSION: NORMAL
EOSINOPHIL # BLD AUTO: 0.23 K/UL (ref 0–0.51)
EOSINOPHIL NFR BLD: 3.5 % (ref 0–6.9)
ERYTHROCYTE [DISTWIDTH] IN BLOOD BY AUTOMATED COUNT: 40.6 FL (ref 35.9–50)
GLOBULIN SER CALC-MCNC: 2.9 G/DL (ref 1.9–3.5)
GLUCOSE SERPL-MCNC: 110 MG/DL (ref 65–99)
GLUCOSE UR STRIP.AUTO-MCNC: NEGATIVE MG/DL
HCT VFR BLD AUTO: 46.5 % (ref 42–52)
HGB BLD-MCNC: 15.4 G/DL (ref 14–18)
IMM GRANULOCYTES # BLD AUTO: 0.02 K/UL (ref 0–0.11)
IMM GRANULOCYTES NFR BLD AUTO: 0.3 % (ref 0–0.9)
KETONES UR STRIP.AUTO-MCNC: NEGATIVE MG/DL
LEUKOCYTE ESTERASE UR QL STRIP.AUTO: NEGATIVE
LIPASE SERPL-CCNC: 26 U/L (ref 7–58)
LYMPHOCYTES # BLD AUTO: 2.06 K/UL (ref 1–4.8)
LYMPHOCYTES NFR BLD: 31.3 % (ref 22–41)
MCH RBC QN AUTO: 28.9 PG (ref 27–33)
MCHC RBC AUTO-ENTMCNC: 33.1 G/DL (ref 33.7–35.3)
MCV RBC AUTO: 87.4 FL (ref 81.4–97.8)
MICRO URNS: ABNORMAL
MONOCYTES # BLD AUTO: 0.58 K/UL (ref 0–0.85)
MONOCYTES NFR BLD AUTO: 8.8 % (ref 0–13.4)
MUCOUS THREADS #/AREA URNS HPF: ABNORMAL /HPF
NEUTROPHILS # BLD AUTO: 3.63 K/UL (ref 1.82–7.42)
NEUTROPHILS NFR BLD: 55.2 % (ref 44–72)
NITRITE UR QL STRIP.AUTO: NEGATIVE
NRBC # BLD AUTO: 0 K/UL
NRBC BLD-RTO: 0 /100 WBC
PH UR STRIP.AUTO: 7.5 [PH]
PLATELET # BLD AUTO: 194 K/UL (ref 164–446)
PMV BLD AUTO: 10.1 FL (ref 9–12.9)
POTASSIUM SERPL-SCNC: 3.7 MMOL/L (ref 3.6–5.5)
PROT SERPL-MCNC: 7 G/DL (ref 6–8.2)
PROT UR QL STRIP: NEGATIVE MG/DL
RBC # BLD AUTO: 5.32 M/UL (ref 4.7–6.1)
RBC # URNS HPF: ABNORMAL /HPF
RBC UR QL AUTO: ABNORMAL
SODIUM SERPL-SCNC: 137 MMOL/L (ref 135–145)
SP GR UR STRIP.AUTO: 1.01
WBC # BLD AUTO: 6.6 K/UL (ref 4.8–10.8)

## 2019-06-28 PROCEDURE — 83690 ASSAY OF LIPASE: CPT

## 2019-06-28 PROCEDURE — 74176 CT ABD & PELVIS W/O CONTRAST: CPT

## 2019-06-28 PROCEDURE — 96374 THER/PROPH/DIAG INJ IV PUSH: CPT

## 2019-06-28 PROCEDURE — 700111 HCHG RX REV CODE 636 W/ 250 OVERRIDE (IP): Performed by: EMERGENCY MEDICINE

## 2019-06-28 PROCEDURE — 99285 EMERGENCY DEPT VISIT HI MDM: CPT

## 2019-06-28 PROCEDURE — 85025 COMPLETE CBC W/AUTO DIFF WBC: CPT

## 2019-06-28 PROCEDURE — 80053 COMPREHEN METABOLIC PANEL: CPT

## 2019-06-28 PROCEDURE — 81001 URINALYSIS AUTO W/SCOPE: CPT

## 2019-06-28 PROCEDURE — 93005 ELECTROCARDIOGRAM TRACING: CPT

## 2019-06-28 PROCEDURE — 36415 COLL VENOUS BLD VENIPUNCTURE: CPT

## 2019-06-28 RX ORDER — MORPHINE SULFATE 15 MG/1
15 TABLET, FILM COATED, EXTENDED RELEASE ORAL 3 TIMES DAILY
COMMUNITY

## 2019-06-28 RX ORDER — KETOROLAC TROMETHAMINE 30 MG/ML
30 INJECTION, SOLUTION INTRAMUSCULAR; INTRAVENOUS ONCE
Status: COMPLETED | OUTPATIENT
Start: 2019-06-28 | End: 2019-06-28

## 2019-06-28 RX ORDER — TAMSULOSIN HYDROCHLORIDE 0.4 MG/1
0.4 CAPSULE ORAL DAILY
Qty: 5 CAP | Refills: 0 | Status: SHIPPED | OUTPATIENT
Start: 2019-06-28 | End: 2019-07-03

## 2019-06-28 RX ORDER — IBUPROFEN 800 MG/1
800 TABLET ORAL EVERY 8 HOURS PRN
Qty: 30 TAB | Refills: 0 | Status: SHIPPED | OUTPATIENT
Start: 2019-06-28 | End: 2023-09-15

## 2019-06-28 RX ADMIN — KETOROLAC TROMETHAMINE 30 MG: 30 INJECTION, SOLUTION INTRAMUSCULAR at 07:39

## 2019-06-28 ASSESSMENT — ENCOUNTER SYMPTOMS
HEADACHES: 0
VOMITING: 0
BACK PAIN: 1
CONSTIPATION: 0
FLANK PAIN: 1
SORE THROAT: 1
CHILLS: 0
ABDOMINAL PAIN: 1
NAUSEA: 0
DIARRHEA: 0
FEVER: 0
SHORTNESS OF BREATH: 0

## 2019-06-28 ASSESSMENT — PAIN SCALES - WONG BAKER: WONGBAKER_NUMERICALRESPONSE: HURTS A WHOLE LOT

## 2019-06-28 ASSESSMENT — PAIN DESCRIPTION - DESCRIPTORS
DESCRIPTORS: STABBING;SHARP
DESCRIPTORS: SHARP;STABBING

## 2019-06-28 NOTE — ED PROVIDER NOTES
"ED Provider Note    ED Provider Note    Primary care provider: Nacho Arrieta M.D.  Means of arrival: POV  History obtained from: Patient  History limited by: None    CHIEF COMPLAINT  Chief Complaint   Patient presents with   • Flank Pain     Left sided   • LLQ Pain       HPI  Mere Monge is a 59 y.o. male who presents to the Emergency Department with a chief complaint of left lower quadrant pain that radiates to his left flank.  It started suddenly at 530 this morning.  He felt well yesterday in the previous few days.  He denies any nausea, vomiting or diarrhea.  No fever.  No chest pain or shortness of breath.  No pain with inspiration.  No urinary symptoms.  No hematuria.  States is been dealing with a \"sore throat for a few weeks\".  Is a history of both kidney stones and diverticulitis, both of these are remote.  Diverticulitis was about 15 years ago and his kidney stone was about 20 years ago.  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  Norco 10/325 as well 50 mg of morphine regularly.  He also has a history of bradycardia.  He is followed by pulmonary and has an appointment with cardiology on Monday.  His bradycardia currently, is thought to be secondary to his calcium channel blocker.  He is recently had pulmonary function test as well as an echocardiogram and a chest x-ray which he states he \"passed all of them\".    REVIEW OF SYSTEMS  Review of Systems   Constitutional: Negative for chills and fever.   HENT: Positive for sore throat. Negative for congestion.    Respiratory: Negative for shortness of breath.    Cardiovascular: Negative for chest pain.   Gastrointestinal: Positive for abdominal pain. Negative for constipation, diarrhea, nausea and vomiting.   Genitourinary: Positive for flank pain. Negative for dysuria and hematuria.   Musculoskeletal: Positive for back pain.   Neurological: Negative for headaches.   All other systems reviewed and are negative.      PAST MEDICAL HISTORY   " "has a past medical history of Allergy; Chronic back pain; Chronic pain syndrome; Diverticulitis; Dyslipidemia; Hypertension; Kidney stones; Reactive airways dysfunction syndrome (HCC) (6/3/2019); and Sleep apnea.    SURGICAL HISTORY   has a past surgical history that includes other and other.    SOCIAL HISTORY  Social History   Substance Use Topics   • Smoking status: Never Smoker   • Smokeless tobacco: Never Used   • Alcohol use Yes      Comment: VERY RARE      History   Drug Use No       FAMILY HISTORY  Family History   Problem Relation Age of Onset   • Other Father         leukemia   • Colon Cancer Mother    • Hypertension Brother    • Other Brother         dyslipidemia   • Sleep Apnea Neg Hx        CURRENT MEDICATIONS  Home Medications     Reviewed by Luis Fernando Santoyo R.N. (Registered Nurse) on 06/28/19 at 0701  Med List Status: Complete   Medication Last Dose Status   hydrocodone/acetaminophen (NORCO)  MG TABS 6/28/2019 Active   morphine ER (MS CONTIN) 15 MG Tab CR tablet  Active   simvastatin (ZOCOR) 10 MG TABS 6/27/2019 Active   verapamil SR (CALAN-SR) 240 MG TBCR 6/27/2019 Active                ALLERGIES  Allergies   Allergen Reactions   • Erythromycin Vomiting   • Other Food      Walnuts     • Sudafed [Pseudoephedrine Hcl] Palpitations     Palpitations.       PHYSICAL EXAM  VITAL SIGNS: /77   Pulse (!) 45   Temp 36.7 °C (98.1 °F) (Temporal)   Resp 17   Ht 1.753 m (5' 9\")   Wt 96.6 kg (212 lb 15.4 oz)   SpO2 95%   BMI 31.45 kg/m²   Vitals reviewed.  Constitutional: Patient is oriented to person, place, and time. Appears well-developed and well-nourished. Mild distress.    Head: Normocephalic and atraumatic.   Ears: Normal external ears bilaterally.   Mouth/Throat: Oropharynx is clear and moist, no exudates.   Eyes: Conjunctivae are normal.   Neck: Normal range of motion. Neck supple.  Cardiovascular: Normal rate, regular rhythm and normal heart sounds. Normal peripheral pulses, Bilateral " UE.  Pulmonary/Chest: Effort normal and breath sounds normal. No respiratory distress, no wheezes, rhonchi, or rales. No chest wall tenderness.  Abdominal: Soft. Bowel sounds are normal. There is LLQ tenderness. No rebound or guarding, or peritoneal signs. Radiation to left CVA. NO CVA tenderness on palp.  Musculoskeletal: No edema and no tenderness.   Neurological: No focal deficits.   Skin: Skin is warm and dry. No erythema. No pallor.   Psychiatric: Patient has a normal mood and affect.     LABS  Results for orders placed or performed during the hospital encounter of 06/28/19   CBC WITH DIFFERENTIAL   Result Value Ref Range    WBC 6.6 4.8 - 10.8 K/uL    RBC 5.32 4.70 - 6.10 M/uL    Hemoglobin 15.4 14.0 - 18.0 g/dL    Hematocrit 46.5 42.0 - 52.0 %    MCV 87.4 81.4 - 97.8 fL    MCH 28.9 27.0 - 33.0 pg    MCHC 33.1 (L) 33.7 - 35.3 g/dL    RDW 40.6 35.9 - 50.0 fL    Platelet Count 194 164 - 446 K/uL    MPV 10.1 9.0 - 12.9 fL    Neutrophils-Polys 55.20 44.00 - 72.00 %    Lymphocytes 31.30 22.00 - 41.00 %    Monocytes 8.80 0.00 - 13.40 %    Eosinophils 3.50 0.00 - 6.90 %    Basophils 0.90 0.00 - 1.80 %    Immature Granulocytes 0.30 0.00 - 0.90 %    Nucleated RBC 0.00 /100 WBC    Neutrophils (Absolute) 3.63 1.82 - 7.42 K/uL    Lymphs (Absolute) 2.06 1.00 - 4.80 K/uL    Monos (Absolute) 0.58 0.00 - 0.85 K/uL    Eos (Absolute) 0.23 0.00 - 0.51 K/uL    Baso (Absolute) 0.06 0.00 - 0.12 K/uL    Immature Granulocytes (abs) 0.02 0.00 - 0.11 K/uL    NRBC (Absolute) 0.00 K/uL   COMP METABOLIC PANEL   Result Value Ref Range    Sodium 137 135 - 145 mmol/L    Potassium 3.7 3.6 - 5.5 mmol/L    Chloride 102 96 - 112 mmol/L    Co2 27 20 - 33 mmol/L    Anion Gap 8.0 0.0 - 11.9    Glucose 110 (H) 65 - 99 mg/dL    Bun 15 8 - 22 mg/dL    Creatinine 1.11 0.50 - 1.40 mg/dL    Calcium 8.5 8.4 - 10.2 mg/dL    AST(SGOT) 35 12 - 45 U/L    ALT(SGPT) 42 2 - 50 U/L    Alkaline Phosphatase 91 30 - 99 U/L    Total Bilirubin 0.7 0.1 - 1.5 mg/dL     Albumin 4.1 3.2 - 4.9 g/dL    Total Protein 7.0 6.0 - 8.2 g/dL    Globulin 2.9 1.9 - 3.5 g/dL    A-G Ratio 1.4 g/dL   LIPASE   Result Value Ref Range    Lipase 26 7 - 58 U/L   URINALYSIS CULTURE, IF INDICATED   Result Value Ref Range    Color Yellow     Character Clear     Specific Gravity 1.010 <1.035    Ph 7.5 5.0 - 8.0    Glucose Negative Negative mg/dL    Ketones Negative Negative mg/dL    Protein Negative Negative mg/dL    Bilirubin Negative Negative    Nitrite Negative Negative    Leukocyte Esterase Negative Negative    Occult Blood Large (A) Negative    Micro Urine Req Microscopic    URINE MICROSCOPIC (W/UA)   Result Value Ref Range    RBC 20-50 (A) /hpf    Bacteria Rare (A) None /hpf    Mucous Threads Moderate /hpf   ESTIMATED GFR   Result Value Ref Range    GFR If African American >60 >60 mL/min/1.73 m 2    GFR If Non African American >60 >60 mL/min/1.73 m 2       All labs reviewed by me.    EKG Interpretation  Interpreted by me    Rhythm: Sinus bradycardia  Rate: 47  Axis: normal  Ectopy: none  Conduction: normal  ST Segments: no acute change  T Waves: no acute change  Q Waves: none    Clinical Impression: No old EKG for evaluation.  Sinus bradycardia.  Otherwise no acute morphology changes.    RADIOLOGY  CT-RENAL COLIC EVALUATION(A/P W/O)   Final Result         1.  Mild left hydronephrosis secondary to single 4.5 mm stone in the distal ureter at the level of the left UVJ.      2.  No residual left nephrolithiasis.      3.  No right hydronephrosis or right nephrolithiasis.      4.  No inflammatory change of the bowel.        The radiologist's interpretation of all radiological studies have been reviewed by me.    COURSE & MEDICAL DECISION MAKING  Pertinent Labs & Imaging studies reviewed. (See chart for details)    Obtained and reviewed past medical records.  Patient's last encounter was in outpatient visit, follow-up with pulmonary.  She was seen for asthma.  Noted to have an echo in May of this year,  which was unremarkable.  EF noted to be 70%.  Pulmonary function studies and on Mayelin 3 of this year.  He has a history of obstructive sleep apnea.  Heart rate on that visit was 46.  Patient takes verapamil 240 mg daily for hypertension.  He reported at that time, that his heart rate is always low.  States that even when exercising, he cannot get his heart rate above 70.  No prior ED encounters or admissions in our EMR going back to 2012.    7:01 AM - Patient seen and examined at bedside.  This is a very pleasant 59-year-old male who presents with his wife with left lower quadrant pain and left flank pain.  Patient's pain started sudden in onset.  No associated GI symptoms such as nausea, vomiting or diarrhea, no hematuria.  He is bradycardic which is baseline in the process of being evaluated.  At this point, thought to be secondary to his check calcium channel blocker.  He will be treated with Toradol.  IV established, labs drawn per nursing protocols.  CT ordered for evaluation of etiology of pain.  Patient will be kept n.p.o. at this time.      The differential diagnoses include but are not limited to: Kidney stone, pyelonephritis, diverticulitis, dehydration    8:16 AM, patient's reevaluated the bedside.  He sitting up, texting on his smart phone.  No distress.  He reports feeling better.  We discussed CT findings consistent with a left-sided 4.5 mm UVJ stone.  No evidence of urinary tract infection.  Kidney function and labs are overall unrevealing.  I discussed follow-up with Dr. Ross is on-call for urology.  I suggested calling today to schedule an appointment early next week.  We discussed drinking plenty of fluid.  Patient already on Norco and morphine at home and I have advised I would not add additional opioids to this regiment.  I have suggested anti-inflammatories and he will be prescribed Flomax as well as Motrin.  Patient is well-appearing and nontoxic.  He will keep his scheduled follow-up  appointment next week with cardiology for continued evaluation of his bradycardia.      Patient's discharged home in stable condition.    FINAL IMPRESSION  1. Ureterolithiasis    2. Flank pain    3. LLQ pain    4. Sinus bradycardia

## 2019-06-28 NOTE — ED NOTES
0726:  PIV placed in RAC, blood drawn and sent to lab.  Pt and family member updated on POC including pending tests.  0739:  Pt medicated as ordered.  Spouse provided w/ warm blankets.  0745:  Pt to CT via [facility].

## 2019-06-28 NOTE — ED NOTES
Reviewed discharge instructions and printed prescriptions x 2 w/ pt and family member, verbalized understanding to information provided including follow up w/ Urology and return precautions.  Pt and family member denied questions/concerns.  Pt ambulated from ED w/ spouse.

## 2019-06-28 NOTE — ED TRIAGE NOTES
Pt ambulates to room w/ steady gait. A & O, privacy, gowned, monitored, plan of care & support given.  Wife at bedside.    Pt states he woke to go to BR and noticed L flank/LLQ pain. Pt states nothing improves the pain and states it is gradually getting worse. 7-8/10.    Pt states +bladder / bowel movements this morning.    Last oral intake- 1900 last noc and water this morning at 6.    Hx of kidney stones and diverticulitis. Pt states the pain feels like either one of those experienced in the past.

## 2019-07-01 ENCOUNTER — OFFICE VISIT (OUTPATIENT)
Dept: CARDIOLOGY | Facility: MEDICAL CENTER | Age: 59
End: 2019-07-01
Payer: COMMERCIAL

## 2019-07-01 VITALS
WEIGHT: 214.51 LBS | OXYGEN SATURATION: 95 % | HEIGHT: 69 IN | SYSTOLIC BLOOD PRESSURE: 132 MMHG | HEART RATE: 50 BPM | BODY MASS INDEX: 31.77 KG/M2 | DIASTOLIC BLOOD PRESSURE: 78 MMHG

## 2019-07-01 DIAGNOSIS — R06.02 SHORTNESS OF BREATH: ICD-10-CM

## 2019-07-01 PROCEDURE — 99204 OFFICE O/P NEW MOD 45 MIN: CPT | Performed by: INTERNAL MEDICINE

## 2019-07-01 RX ORDER — AMLODIPINE BESYLATE 10 MG/1
10 TABLET ORAL DAILY
Qty: 30 TAB | Refills: 11 | Status: SHIPPED | OUTPATIENT
Start: 2019-07-01 | End: 2020-05-01

## 2019-07-01 NOTE — LETTER
[facility]   [address]  [place] [address]  Phone: [phone]  Fax: [phone]              Mere Monge  1960    Encounter Date: 7/1/2019    Carlos Gage M.D.          PROGRESS NOTE:      Cardiology Initial Consultation Note    Date of note:    7/1/2019    Primary Care Provider: Nacho Arrieta M.D.  Referring Provider: Madeleine Kiser A*     Patient Name: Mere Monge   YOB: 1960  MRN:              [id number]    Chief Complaint: Fatigue, dyspnea on exertion    Mere Monge is a 59 y.o. male  patient presented today with fatigue, dyspnea on exertion.  Insidious in onset, stable for the last 9 months.  He is very active and skies but notices shortness of breath with exertion.  This was evaluated by his primary care physician.  Echocardiogram which was normal, EKG shows his heart rate in the 50s.  He is scheduled to have a Holter monitoring, referred here.  Denies chest pain, no family history of coronary artery disease.    ROS   Recent episode of left flank pain, currently better.  All other systems reviewed and discussed using a comprehensive questionnaire and are negative.     Past medical history, family history, social history, allergies and labs are reviewed and updated as needed as documented below.    Past Medical History:   Diagnosis Date   • Allergy    • Chronic back pain    • Chronic pain syndrome    • Diverticulitis    • Dyslipidemia    • Hypertension    • Kidney stones    • Reactive airways dysfunction syndrome (HCC) 6/3/2019   • Sleep apnea          Past Surgical History:   Procedure Laterality Date   • OTHER      deviated septum repair   • OTHER      L5-S1 surgery         Current Outpatient Prescriptions   Medication Sig Dispense Refill   • amLODIPine (NORVASC) 10 MG Tab Take 1 Tab by mouth every day. 30 Tab 11   • morphine ER (MS CONTIN) 15 MG Tab CR tablet Take 15 mg by mouth 4 times a  "day.     • tamsulosin (FLOMAX) 0.4 MG capsule Take 1 Cap by mouth every day for 5 days. 5 Cap 0   • ibuprofen (MOTRIN) 800 MG Tab Take 1 Tab by mouth every 8 hours as needed. 30 Tab 0   • simvastatin (ZOCOR) 10 MG TABS Take 10 mg by mouth every evening.       • hydrocodone/acetaminophen (NORCO)  MG TABS Take 1 Tab by mouth every 6 hours as needed for Mild Pain. No driving or operation of machinery after taking this medication. 15 Each 0   • tamsulosin (FLOMAX) 0.4 MG capsule Take 1 Cap by mouth every day for 5 days. (Patient not taking: Reported on 7/1/2019) 5 Cap 0     No current facility-administered medications for this visit.          Allergies   Allergen Reactions   • Erythromycin Vomiting   • Other Food      Walnuts     • Sudafed [Pseudoephedrine Hcl] Palpitations     Palpitations.         Family History   Problem Relation Age of Onset   • Other Father         leukemia   • Colon Cancer Mother    • Hypertension Brother    • Other Brother         dyslipidemia   • Sleep Apnea Neg Hx          Social History     Social History   • Marital status:      Spouse name: N/A   • Number of children: N/A   • Years of education: N/A     Occupational History   • Not on file.     Social History Main Topics   • Smoking status: Never Smoker   • Smokeless tobacco: Never Used   • Alcohol use Yes      Comment: VERY RARE   • Drug use: No   • Sexual activity: Not on file     Other Topics Concern   • Not on file     Social History Narrative   • No narrative on file         Physical Exam:  Ambulatory Vitals  /78 (BP Location: Left arm, Patient Position: Sitting, BP Cuff Size: Adult)   Pulse (!) 50   Ht 1.753 m (5' 9\")   Wt 97.3 kg (214 lb 8.1 oz)   SpO2 95%    Oxygen Therapy:  Pulse Oximetry: 95 %  BP Readings from Last 4 Encounters:   07/01/19 132/78   06/28/19 152/77   06/03/19 120/60   05/02/19 130/70       Weight/BMI: Body mass index is 31.68 kg/m².  Wt Readings from Last 4 Encounters:   07/01/19 97.3 kg (214 " lb 8.1 oz)   06/28/19 96.6 kg (212 lb 15.4 oz)   06/03/19 97.1 kg (214 lb)   06/03/19 97.1 kg (214 lb)     General: Well appearing and in no apparent distress  Head: atrumatic  Eyes: No conjunctival pallor   ENT: normal external appearance of nose and ears  Neck: JVD absent, carotid bruits absent  Lungs: respiratory sounds  normal, additional breath sounds absent  Heart: Regular rhythm,   No palpable thrills on palpation, aortic flow murmur, no rubs,   Lower extremity edema absent.   Pedal pulses normal  Abdomen: soft, non tender, non distended.  Extremities/MSK: no clubbing, no cyanosis  Neurological: normal orientation, Gait normal   Psychiatric: Appropriate affect, intact judgement and insight  Skin: Warm extremities      Lab Data Review:  Lab Results   Component Value Date/Time    CHOLSTRLTOT 148 09/25/2013 08:30 AM    LDL 98 09/25/2013 08:30 AM    HDL 29 (A) 09/25/2013 08:30 AM    TRIGLYCERIDE 106 09/25/2013 08:30 AM       Lab Results   Component Value Date/Time    SODIUM 137 06/28/2019 07:26 AM    POTASSIUM 3.7 06/28/2019 07:26 AM    CHLORIDE 102 06/28/2019 07:26 AM    CO2 27 06/28/2019 07:26 AM    GLUCOSE 110 (H) 06/28/2019 07:26 AM    BUN 15 06/28/2019 07:26 AM    CREATININE 1.11 06/28/2019 07:26 AM     Lab Results   Component Value Date/Time    ALKPHOSPHAT 91 06/28/2019 07:26 AM    ASTSGOT 35 06/28/2019 07:26 AM    ALTSGPT 42 06/28/2019 07:26 AM    TBILIRUBIN 0.7 06/28/2019 07:26 AM      Lab Results   Component Value Date/Time    WBC 6.6 06/28/2019 07:26 AM       Cardiac Imaging and Procedures Review:    EKG dated 6/28 : My personal interpretation is sinus bradycardia, heart rate 47    Echo dated 5/29/2019:   CONCLUSIONS  Compared to the images of the prior study done on 07/27/15, no noted   decline in funciton.  Left ventricular ejection fraction is visually estimated to be 70%.  Normal inferior vena cava size and inspiratory collapse.  No significant valve disease or flow abnormalities.   Estimated right  ventricular systolic pressure is 30 mmHg.      Medical Decision Makin-year-old male patient with  #1 dyspnea on exertion, fatigue  2.  Sinus bradycardia  3.  Obstructive sleep apnea on therapy  4.  Hypertension well controlled    -We will stop verapamil because of bradycardia, start amlodipine 10 mg for hypertension.  -His dyspnea on exertion could also be angina, will perform exercise stress echocardiogram in 2 to 3 weeks.  We will also see his baseline heart rate without verapamil and any chronotropic incompetence.  -He is scheduled to have a Holter monitoring next week, we will follow-up on results.  -If all cardiac tests comes back normal, recommend further evaluation per his fatigue, shortness of breath.    Return in about 1 year (around 2020).    This note was dictated using Dragon speech recognition software.    Carlos WHARTON  Interventional cardiologist  [facility] [address]  [address] [address]  Phone: [phone]  Fax: [phone]                    No Recipients

## 2019-07-01 NOTE — PROGRESS NOTES
Cardiology Initial Consultation Note    Date of note:    7/1/2019    Primary Care Provider: Nacho Arrieta M.D.  Referring Provider: Madeleine Kiser A*     Patient Name: Mere Monge   YOB: 1960  MRN:              [id number]    Chief Complaint: Fatigue, dyspnea on exertion    Mere Monge is a 59 y.o. male  patient presented today with fatigue, dyspnea on exertion.  Insidious in onset, stable for the last 9 months.  He is very active and skies but notices shortness of breath with exertion.  This was evaluated by his primary care physician.  Echocardiogram which was normal, EKG shows his heart rate in the 50s.  He is scheduled to have a Holter monitoring, referred here.  Denies chest pain, no family history of coronary artery disease.    ROS   Recent episode of left flank pain, currently better.  All other systems reviewed and discussed using a comprehensive questionnaire and are negative.     Past medical history, family history, social history, allergies and labs are reviewed and updated as needed as documented below.    Past Medical History:   Diagnosis Date   • Allergy    • Chronic back pain    • Chronic pain syndrome    • Diverticulitis    • Dyslipidemia    • Hypertension    • Kidney stones    • Reactive airways dysfunction syndrome (HCC) 6/3/2019   • Sleep apnea          Past Surgical History:   Procedure Laterality Date   • OTHER      deviated septum repair   • OTHER      L5-S1 surgery         Current Outpatient Prescriptions   Medication Sig Dispense Refill   • amLODIPine (NORVASC) 10 MG Tab Take 1 Tab by mouth every day. 30 Tab 11   • morphine ER (MS CONTIN) 15 MG Tab CR tablet Take 15 mg by mouth 4 times a day.     • tamsulosin (FLOMAX) 0.4 MG capsule Take 1 Cap by mouth every day for 5 days. 5 Cap 0   • ibuprofen (MOTRIN) 800 MG Tab Take 1 Tab by mouth every 8 hours as needed. 30 Tab 0   • simvastatin (ZOCOR) 10 MG TABS Take 10 mg by mouth every evening.       •  "hydrocodone/acetaminophen (NORCO)  MG TABS Take 1 Tab by mouth every 6 hours as needed for Mild Pain. No driving or operation of machinery after taking this medication. 15 Each 0   • tamsulosin (FLOMAX) 0.4 MG capsule Take 1 Cap by mouth every day for 5 days. (Patient not taking: Reported on 7/1/2019) 5 Cap 0     No current facility-administered medications for this visit.          Allergies   Allergen Reactions   • Erythromycin Vomiting   • Other Food      Walnuts     • Sudafed [Pseudoephedrine Hcl] Palpitations     Palpitations.         Family History   Problem Relation Age of Onset   • Other Father         leukemia   • Colon Cancer Mother    • Hypertension Brother    • Other Brother         dyslipidemia   • Sleep Apnea Neg Hx          Social History     Social History   • Marital status:      Spouse name: N/A   • Number of children: N/A   • Years of education: N/A     Occupational History   • Not on file.     Social History Main Topics   • Smoking status: Never Smoker   • Smokeless tobacco: Never Used   • Alcohol use Yes      Comment: VERY RARE   • Drug use: No   • Sexual activity: Not on file     Other Topics Concern   • Not on file     Social History Narrative   • No narrative on file         Physical Exam:  Ambulatory Vitals  /78 (BP Location: Left arm, Patient Position: Sitting, BP Cuff Size: Adult)   Pulse (!) 50   Ht 1.753 m (5' 9\")   Wt 97.3 kg (214 lb 8.1 oz)   SpO2 95%    Oxygen Therapy:  Pulse Oximetry: 95 %  BP Readings from Last 4 Encounters:   07/01/19 132/78   06/28/19 152/77   06/03/19 120/60   05/02/19 130/70       Weight/BMI: Body mass index is 31.68 kg/m².  Wt Readings from Last 4 Encounters:   07/01/19 97.3 kg (214 lb 8.1 oz)   06/28/19 96.6 kg (212 lb 15.4 oz)   06/03/19 97.1 kg (214 lb)   06/03/19 97.1 kg (214 lb)     General: Well appearing and in no apparent distress  Head: atrumatic  Eyes: No conjunctival pallor   ENT: normal external appearance of nose and " ears  Neck: JVD absent, carotid bruits absent  Lungs: respiratory sounds  normal, additional breath sounds absent  Heart: Regular rhythm,   No palpable thrills on palpation, aortic flow murmur, no rubs,   Lower extremity edema absent.   Pedal pulses normal  Abdomen: soft, non tender, non distended.  Extremities/MSK: no clubbing, no cyanosis  Neurological: normal orientation, Gait normal   Psychiatric: Appropriate affect, intact judgement and insight  Skin: Warm extremities      Lab Data Review:  Lab Results   Component Value Date/Time    CHOLSTRLTOT 148 2013 08:30 AM    LDL 98 2013 08:30 AM    HDL 29 (A) 2013 08:30 AM    TRIGLYCERIDE 106 2013 08:30 AM       Lab Results   Component Value Date/Time    SODIUM 137 2019 07:26 AM    POTASSIUM 3.7 2019 07:26 AM    CHLORIDE 102 2019 07:26 AM    CO2 27 2019 07:26 AM    GLUCOSE 110 (H) 2019 07:26 AM    BUN 15 2019 07:26 AM    CREATININE 1.11 2019 07:26 AM     Lab Results   Component Value Date/Time    ALKPHOSPHAT 91 2019 07:26 AM    ASTSGOT 35 2019 07:26 AM    ALTSGPT 42 2019 07:26 AM    TBILIRUBIN 0.7 2019 07:26 AM      Lab Results   Component Value Date/Time    WBC 6.6 2019 07:26 AM       Cardiac Imaging and Procedures Review:    EKG dated  : My personal interpretation is sinus bradycardia, heart rate 47    Echo dated 2019:   CONCLUSIONS  Compared to the images of the prior study done on 07/27/15, no noted   decline in funciton.  Left ventricular ejection fraction is visually estimated to be 70%.  Normal inferior vena cava size and inspiratory collapse.  No significant valve disease or flow abnormalities.   Estimated right ventricular systolic pressure is 30 mmHg.      Medical Decision Makin-year-old male patient with  #1 dyspnea on exertion, fatigue  2.  Sinus bradycardia  3.  Obstructive sleep apnea on therapy  4.  Hypertension well controlled    -We will stop  verapamil because of bradycardia, start amlodipine 10 mg for hypertension.  -His dyspnea on exertion could also be angina, will perform exercise stress echocardiogram in 2 to 3 weeks.  We will also see his baseline heart rate without verapamil and any chronotropic incompetence.  -He is scheduled to have a Holter monitoring next week, we will follow-up on results.  -If all cardiac tests comes back normal, recommend further evaluation per his fatigue, shortness of breath with PFTs, thyroid panel etc.    Return in about 1 year (around 7/1/2020).    This note was dictated using Dragon speech recognition software.    Carlos WHARTON  Interventional cardiologist  [facility]  [address] [address]  Phone: [phone]  Fax: [phone]

## 2019-07-08 ENCOUNTER — NON-PROVIDER VISIT (OUTPATIENT)
Dept: CARDIOLOGY | Facility: MEDICAL CENTER | Age: 59
End: 2019-07-08
Payer: COMMERCIAL

## 2019-07-08 DIAGNOSIS — R06.02 SOB (SHORTNESS OF BREATH): ICD-10-CM

## 2019-07-08 DIAGNOSIS — R00.1 BRADYCARDIA: ICD-10-CM

## 2019-07-08 DIAGNOSIS — R00.1 SINUS BRADYCARDIA: ICD-10-CM

## 2019-07-08 PROCEDURE — 93224 XTRNL ECG REC UP TO 48 HRS: CPT | Performed by: INTERNAL MEDICINE

## 2019-07-11 LAB — EKG IMPRESSION: NORMAL

## 2019-07-22 ENCOUNTER — HOSPITAL ENCOUNTER (OUTPATIENT)
Dept: RADIOLOGY | Facility: MEDICAL CENTER | Age: 59
End: 2019-07-22
Attending: UROLOGY
Payer: COMMERCIAL

## 2019-07-22 DIAGNOSIS — R10.9 ABDOMINAL PAIN, UNSPECIFIED ABDOMINAL LOCATION: ICD-10-CM

## 2019-07-22 DIAGNOSIS — N20.1 CALCULUS OF URETER: ICD-10-CM

## 2019-07-22 PROCEDURE — 76775 US EXAM ABDO BACK WALL LIM: CPT

## 2019-07-25 ENCOUNTER — HOSPITAL ENCOUNTER (OUTPATIENT)
Dept: CARDIOLOGY | Facility: MEDICAL CENTER | Age: 59
End: 2019-07-25
Attending: INTERNAL MEDICINE
Payer: COMMERCIAL

## 2019-07-25 DIAGNOSIS — R06.02 SHORTNESS OF BREATH: ICD-10-CM

## 2019-07-25 LAB — LV EJECT FRACT  99904: 60

## 2019-07-25 PROCEDURE — 93350 STRESS TTE ONLY: CPT | Mod: 26 | Performed by: INTERNAL MEDICINE

## 2019-07-25 PROCEDURE — 93017 CV STRESS TEST TRACING ONLY: CPT

## 2019-07-25 PROCEDURE — 93018 CV STRESS TEST I&R ONLY: CPT | Performed by: INTERNAL MEDICINE

## 2019-07-30 ENCOUNTER — TELEPHONE (OUTPATIENT)
Dept: CARDIOLOGY | Facility: MEDICAL CENTER | Age: 59
End: 2019-07-30

## 2019-07-30 NOTE — LETTER
July 30, 2019        Mere Monge  [address] [address]  [place] [address]          Dear Mere,    We have received the results of your recent:    Echocardiogram.    Your test came back within normal limits. Please follow up as previously discussed with your physician in 1 year.      Feel free to call us with any questions.        Sincerely,    Winter MAS

## 2019-07-30 NOTE — TELEPHONE ENCOUNTER
Carlos Gage M.D.  Stacey Mustafa, R.N.             No need to repeat, 1 year follow up please      Results letter mailed to patient.

## 2019-08-05 ENCOUNTER — PATIENT MESSAGE (OUTPATIENT)
Dept: PULMONOLOGY | Facility: HOSPICE | Age: 59
End: 2019-08-05

## 2019-08-05 DIAGNOSIS — G47.33 OSA (OBSTRUCTIVE SLEEP APNEA): ICD-10-CM

## 2019-08-05 NOTE — PATIENT COMMUNICATION
I see no order for a sleep study and no mention for patient to complete new one last seen 6/3/19 evie Garcia no order. Please advise

## 2019-08-05 NOTE — TELEPHONE ENCOUNTER
From: Mere Monge  To: SCHUYLER Nagel  Sent: 8/5/2019 2:13 PM PDT  Subject: Procedure Question    I have met my insurance deductable for the year, so I thought since you wanted to do another Sleep study that we should schedule one towards the end of August. Please have someone call me to schedule this if you still want one.

## 2019-08-06 NOTE — PATIENT COMMUNICATION
I reviewed prior notes, and patient was having intermittent nights with elevated AHI. Currently on CPAP 10-16cm. Advised titration study to verify adequate setting for patient.  Order signed for CPAP/BIPAP titration.

## 2019-09-20 ENCOUNTER — SLEEP STUDY (OUTPATIENT)
Dept: SLEEP MEDICINE | Facility: MEDICAL CENTER | Age: 59
End: 2019-09-20
Attending: NURSE PRACTITIONER
Payer: COMMERCIAL

## 2019-09-20 DIAGNOSIS — G47.33 OSA (OBSTRUCTIVE SLEEP APNEA): ICD-10-CM

## 2019-09-20 PROCEDURE — 95811 POLYSOM 6/>YRS CPAP 4/> PARM: CPT | Performed by: FAMILY MEDICINE

## 2019-09-22 ENCOUNTER — OFFICE VISIT (OUTPATIENT)
Dept: URGENT CARE | Facility: CLINIC | Age: 59
End: 2019-09-22
Payer: COMMERCIAL

## 2019-09-22 VITALS
DIASTOLIC BLOOD PRESSURE: 86 MMHG | BODY MASS INDEX: 31.1 KG/M2 | HEART RATE: 63 BPM | SYSTOLIC BLOOD PRESSURE: 150 MMHG | HEIGHT: 69 IN | WEIGHT: 210 LBS | OXYGEN SATURATION: 97 % | TEMPERATURE: 97.8 F

## 2019-09-22 DIAGNOSIS — H81.13 BPPV (BENIGN PAROXYSMAL POSITIONAL VERTIGO), BILATERAL: ICD-10-CM

## 2019-09-22 DIAGNOSIS — H66.002 ACUTE SUPPURATIVE OTITIS MEDIA OF LEFT EAR WITHOUT SPONTANEOUS RUPTURE OF TYMPANIC MEMBRANE, RECURRENCE NOT SPECIFIED: Primary | ICD-10-CM

## 2019-09-22 PROCEDURE — 99214 OFFICE O/P EST MOD 30 MIN: CPT | Performed by: PHYSICIAN ASSISTANT

## 2019-09-22 RX ORDER — FINASTERIDE 5 MG/1
TABLET, FILM COATED ORAL
COMMUNITY
Start: 2019-08-19 | End: 2023-09-15

## 2019-09-22 RX ORDER — PROCHLORPERAZINE MALEATE 10 MG
10 TABLET ORAL EVERY 6 HOURS PRN
Qty: 30 TAB | Refills: 0 | Status: SHIPPED | OUTPATIENT
Start: 2019-09-22 | End: 2023-09-15

## 2019-09-22 RX ORDER — TAMSULOSIN HYDROCHLORIDE 0.4 MG/1
0.4 CAPSULE ORAL
Refills: 0 | COMMUNITY
Start: 2019-08-23 | End: 2023-09-15

## 2019-09-22 RX ORDER — CEFDINIR 300 MG/1
300 CAPSULE ORAL 2 TIMES DAILY
Qty: 14 CAP | Refills: 0 | Status: SHIPPED | OUTPATIENT
Start: 2019-09-22 | End: 2019-09-29

## 2019-09-22 NOTE — PATIENT INSTRUCTIONS
Benign Positional Vertigo  Introduction  Vertigo is the feeling that you or your surroundings are moving when they are not. Benign positional vertigo is the most common form of vertigo. The cause of this condition is not serious (is benign). This condition is triggered by certain movements and positions (is positional). This condition can be dangerous if it occurs while you are doing something that could endanger you or others, such as driving.  What are the causes?  In many cases, the cause of this condition is not known. It may be caused by a disturbance in an area of the inner ear that helps your brain to sense movement and balance. This disturbance can be caused by a viral infection (labyrinthitis), head injury, or repetitive motion.  What increases the risk?  This condition is more likely to develop in:  · Women.  · People who are 50 years of age or older.  What are the signs or symptoms?  Symptoms of this condition usually happen when you move your head or your eyes in different directions. Symptoms may start suddenly, and they usually last for less than a minute. Symptoms may include:  · Loss of balance and falling.  · Feeling like you are spinning or moving.  · Feeling like your surroundings are spinning or moving.  · Nausea and vomiting.  · Blurred vision.  · Dizziness.  · Involuntary eye movement (nystagmus).  Symptoms can be mild and cause only slight annoyance, or they can be severe and interfere with daily life. Episodes of benign positional vertigo may return (recur) over time, and they may be triggered by certain movements. Symptoms may improve over time.  How is this diagnosed?  This condition is usually diagnosed by medical history and a physical exam of the head, neck, and ears. You may be referred to a health care provider who specializes in ear, nose, and throat (ENT) problems (otolaryngologist) or a provider who specializes in disorders of the nervous system (neurologist). You may have  additional testing, including:  · MRI.  · A CT scan.  · Eye movement tests. Your health care provider may ask you to change positions quickly while he or she watches you for symptoms of benign positional vertigo, such as nystagmus. Eye movement may be tested with an electronystagmogram (ENG), caloric stimulation, the Kenroy-Hallpike test, or the roll test.  · An electroencephalogram (EEG). This records electrical activity in your brain.  · Hearing tests.  How is this treated?  Usually, your health care provider will treat this by moving your head in specific positions to adjust your inner ear back to normal. Surgery may be needed in severe cases, but this is rare. In some cases, benign positional vertigo may resolve on its own in 2-4 weeks.  Follow these instructions at home:  Safety  · Move slowly.Avoid sudden body or head movements.  · Avoid driving.  · Avoid operating heavy machinery.  · Avoid doing any tasks that would be dangerous to you or others if a vertigo episode would occur.  · If you have trouble walking or keeping your balance, try using a cane for stability. If you feel dizzy or unstable, sit down right away.  · Return to your normal activities as told by your health care provider. Ask your health care provider what activities are safe for you.  General instructions  · Take over-the-counter and prescription medicines only as told by your health care provider.  · Avoid certain positions or movements as told by your health care provider.  · Drink enough fluid to keep your urine clear or pale yellow.  · Keep all follow-up visits as told by your health care provider. This is important.  Contact a health care provider if:  · You have a fever.  · Your condition gets worse or you develop new symptoms.  · Your family or friends notice any behavioral changes.  · Your nausea or vomiting gets worse.  · You have numbness or a “pins and needles” sensation.  Get help right away if:  · You have difficulty speaking or  moving.  · You are always dizzy.  · You faint.  · You develop severe headaches.  · You have weakness in your legs or arms.  · You have changes in your hearing or vision.  · You develop a stiff neck.  · You develop sensitivity to light.  This information is not intended to replace advice given to you by your health care provider. Make sure you discuss any questions you have with your health care provider.  Document Released: 09/25/2007 Document Revised: 05/25/2017 Document Reviewed: 04/11/2016  © 2017 [employer]vier

## 2019-09-23 ENCOUNTER — HOSPITAL ENCOUNTER (EMERGENCY)
Facility: MEDICAL CENTER | Age: 59
End: 2019-09-23
Attending: EMERGENCY MEDICINE
Payer: COMMERCIAL

## 2019-09-23 ENCOUNTER — APPOINTMENT (OUTPATIENT)
Dept: RADIOLOGY | Facility: MEDICAL CENTER | Age: 59
End: 2019-09-23
Attending: EMERGENCY MEDICINE
Payer: COMMERCIAL

## 2019-09-23 VITALS
DIASTOLIC BLOOD PRESSURE: 81 MMHG | OXYGEN SATURATION: 95 % | HEART RATE: 51 BPM | RESPIRATION RATE: 20 BRPM | WEIGHT: 215.83 LBS | BODY MASS INDEX: 31.87 KG/M2 | SYSTOLIC BLOOD PRESSURE: 151 MMHG | TEMPERATURE: 98.6 F

## 2019-09-23 DIAGNOSIS — H53.2 DIPLOPIA: ICD-10-CM

## 2019-09-23 LAB
ALBUMIN SERPL BCP-MCNC: 4.5 G/DL (ref 3.2–4.9)
ALBUMIN/GLOB SERPL: 1.4 G/DL
ALP SERPL-CCNC: 96 U/L (ref 30–99)
ALT SERPL-CCNC: 67 U/L (ref 2–50)
ANION GAP SERPL CALC-SCNC: 14 MMOL/L (ref 0–11.9)
AST SERPL-CCNC: 47 U/L (ref 12–45)
BASOPHILS # BLD AUTO: 0.6 % (ref 0–1.8)
BASOPHILS # BLD: 0.04 K/UL (ref 0–0.12)
BILIRUB SERPL-MCNC: 0.7 MG/DL (ref 0.1–1.5)
BUN SERPL-MCNC: 14 MG/DL (ref 8–22)
CALCIUM SERPL-MCNC: 9.4 MG/DL (ref 8.4–10.2)
CHLORIDE SERPL-SCNC: 104 MMOL/L (ref 96–112)
CO2 SERPL-SCNC: 23 MMOL/L (ref 20–33)
CREAT SERPL-MCNC: 0.97 MG/DL (ref 0.5–1.4)
EKG IMPRESSION: NORMAL
EOSINOPHIL # BLD AUTO: 0.36 K/UL (ref 0–0.51)
EOSINOPHIL NFR BLD: 5.2 % (ref 0–6.9)
ERYTHROCYTE [DISTWIDTH] IN BLOOD BY AUTOMATED COUNT: 40.8 FL (ref 35.9–50)
GLOBULIN SER CALC-MCNC: 3.2 G/DL (ref 1.9–3.5)
GLUCOSE SERPL-MCNC: 110 MG/DL (ref 65–99)
HCT VFR BLD AUTO: 46.7 % (ref 42–52)
HGB BLD-MCNC: 15.3 G/DL (ref 14–18)
IMM GRANULOCYTES # BLD AUTO: 0.02 K/UL (ref 0–0.11)
IMM GRANULOCYTES NFR BLD AUTO: 0.3 % (ref 0–0.9)
LYMPHOCYTES # BLD AUTO: 1.5 K/UL (ref 1–4.8)
LYMPHOCYTES NFR BLD: 21.8 % (ref 22–41)
MCH RBC QN AUTO: 28.4 PG (ref 27–33)
MCHC RBC AUTO-ENTMCNC: 32.8 G/DL (ref 33.7–35.3)
MCV RBC AUTO: 86.8 FL (ref 81.4–97.8)
MONOCYTES # BLD AUTO: 0.74 K/UL (ref 0–0.85)
MONOCYTES NFR BLD AUTO: 10.8 % (ref 0–13.4)
NEUTROPHILS # BLD AUTO: 4.22 K/UL (ref 1.82–7.42)
NEUTROPHILS NFR BLD: 61.3 % (ref 44–72)
NRBC # BLD AUTO: 0 K/UL
NRBC BLD-RTO: 0 /100 WBC
PLATELET # BLD AUTO: 183 K/UL (ref 164–446)
PMV BLD AUTO: 10.6 FL (ref 9–12.9)
POTASSIUM SERPL-SCNC: 4.3 MMOL/L (ref 3.6–5.5)
PROT SERPL-MCNC: 7.7 G/DL (ref 6–8.2)
RBC # BLD AUTO: 5.38 M/UL (ref 4.7–6.1)
SODIUM SERPL-SCNC: 141 MMOL/L (ref 135–145)
WBC # BLD AUTO: 6.9 K/UL (ref 4.8–10.8)

## 2019-09-23 PROCEDURE — 85025 COMPLETE CBC W/AUTO DIFF WBC: CPT

## 2019-09-23 PROCEDURE — 99284 EMERGENCY DEPT VISIT MOD MDM: CPT

## 2019-09-23 PROCEDURE — 36415 COLL VENOUS BLD VENIPUNCTURE: CPT

## 2019-09-23 PROCEDURE — 70496 CT ANGIOGRAPHY HEAD: CPT

## 2019-09-23 PROCEDURE — 80053 COMPREHEN METABOLIC PANEL: CPT

## 2019-09-23 PROCEDURE — 93005 ELECTROCARDIOGRAM TRACING: CPT | Performed by: EMERGENCY MEDICINE

## 2019-09-23 PROCEDURE — 70498 CT ANGIOGRAPHY NECK: CPT

## 2019-09-23 PROCEDURE — 700117 HCHG RX CONTRAST REV CODE 255: Performed by: EMERGENCY MEDICINE

## 2019-09-23 RX ADMIN — IOHEXOL 100 ML: 350 INJECTION, SOLUTION INTRAVENOUS at 11:33

## 2019-09-23 SDOH — HEALTH STABILITY: MENTAL HEALTH: HOW OFTEN DO YOU HAVE A DRINK CONTAINING ALCOHOL?: MONTHLY OR LESS

## 2019-09-23 SDOH — HEALTH STABILITY: MENTAL HEALTH: HOW MANY STANDARD DRINKS CONTAINING ALCOHOL DO YOU HAVE ON A TYPICAL DAY?: 1 OR 2

## 2019-09-23 ASSESSMENT — LIFESTYLE VARIABLES: DO YOU DRINK ALCOHOL: NO

## 2019-09-23 NOTE — ED NOTES
Pt is c/o back pain that is chronic unrelated to this visit. Has personal pain meds with his and requesting to take them.  infomred pt to not take any home meds until confirmed with ER doc

## 2019-09-23 NOTE — ED NOTES
PT VOICES UNDERSTANDING REGARDING DC INSTRUCTIONS.  NO CONCERNS VOICED. PT ambulated TO THE LOBBY.

## 2019-09-23 NOTE — ED PROVIDER NOTES
ED Provider Note    CHIEF COMPLAINT  Chief Complaint   Patient presents with   • Lightheadedness     Pt reports went to  yesterday, DX w/ positional Vertigo   • Diplopia     this am       HPI  Mere Monge is a 59 y.o. male who presents for evaluation of lightheadedness, vertigo yesterday and now developed intermittent double vision.  Patient denies headache.  He specifically denies focal numbness weakness tingling to the arms legs or face.  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  He developed vertiginous symptoms yesterday went to urgent care it appeared to be positional and likely benign and they placed him on some antiemetics.  This morning he developed what he describes as remittent double vision.  It is only present when he has both eyes open it is not monocular diplopia.  He denies any pain with range of motion in the eyes.  No fever or chills    REVIEW OF SYSTEMS  See HPI for further details.  No night sweats weight loss numbness tingling weakness rash all other systems are negative.     PAST MEDICAL HISTORY  Past Medical History:   Diagnosis Date   • Reactive airways dysfunction syndrome (HCC) 6/3/2019   • Allergy    • Chronic back pain    • Chronic pain syndrome    • Diverticulitis    • Dyslipidemia    • Hypertension    • Kidney stones    • Sleep apnea    • Vertigo        FAMILY HISTORY  Noncontributory    SOCIAL HISTORY  Social History     Socioeconomic History   • Marital status:      Spouse name: Not on file   • Number of children: Not on file   • Years of education: Not on file   • Highest education level: Not on file   Occupational History   • Not on file   Social Needs   • Financial resource strain: Not on file   • Food insecurity:     Worry: Not on file     Inability: Not on file   • Transportation needs:     Medical: Not on file     Non-medical: Not on file   Tobacco Use   • Smoking status: Never  Smoker   • Smokeless tobacco: Never Used   Substance and Sexual Activity   • Alcohol use: Yes     Frequency: Monthly or less     Drinks per session: 1 or 2     Comment: VERY RARE   • Drug use: No   • Sexual activity: Not on file   Lifestyle   • Physical activity:     Days per week: Not on file     Minutes per session: Not on file   • Stress: Not on file   Relationships   • Social connections:     Talks on phone: Not on file     Gets together: Not on file     Attends [employer]op[employer] service: Not on file     Active member of club or organization: Not on file     Attends meetings of clubs or organizations: Not on file     Relationship status: Not on file   • Intimate partner violence:     Fear of current or ex partner: Not on file     Emotionally abused: Not on file     Physically abused: Not on file     Forced sexual activity: Not on file   Other Topics Concern   • Not on file   Social History Narrative   • Not on file     Denies IV drugs  SURGICAL HISTORY  Past Surgical History:   Procedure Laterality Date   • OTHER      deviated septum repair   • OTHER      L5-S1 surgery       CURRENT MEDICATIONS  Home Medications    **Home medications have not yet been reviewed for this encounter**         ALLERGIES  Allergies   Allergen Reactions   • Erythromycin Vomiting   • Other Food      Walnuts     • Sudafed [Pseudoephedrine Hcl] Palpitations     Palpitations.       PHYSICAL EXAM  VITAL SIGNS: /63   Pulse 67   Temp 37 °C (98.6 °F)   Resp 20   Wt 97.9 kg (215 lb 13.3 oz)   SpO2 98%   BMI 31.87 kg/m²       Constitutional: Well developed, Well nourished, No acute distress, Non-toxic appearance.   HENT: Normocephalic, Atraumatic, Bilateral external ears normal, Oropharynx moist, No oral exudates, Nose normal.   Eyes: PERRLA, EOMI, Conjunctiva normal, No discharge.  No horizontal or vertical or rotatory nystagmus  Neck: Normal range of motion, No tenderness, Supple, No stridor.   Cardiovascular: Normal heart rate, Normal  rhythm, No murmurs, No rubs, No gallops.   Thorax & Lungs: Normal breath sounds, No respiratory distress, No wheezing, No chest tenderness.   Abdomen: Bowel sounds normal, Soft, No tenderness, No masses, No pulsatile masses.   Skin: Warm, Dry, No erythema, No rash.   Back: No tenderness, No CVA tenderness.   Extremities: Intact distal pulses, No edema, No tenderness, No cyanosis, No clubbing.   Neurologic: Alert & oriented x 3, Normal motor function, Normal sensory function, No focal deficits noted.  Cranial nerves II through XII grossly intact finger-nose testing is normal no pronator drift of the upper lower extremities.  Psychiatric: Anxious      COURSE & MEDICAL DECISION MAKING  Pertinent Labs & Imaging studies reviewed. (See chart for details)  CT-CTA NECK WITH & W/O-POST PROCESSING   Final Result      Patent carotid and vertebral arteries. No evidence of occlusion or dissection.      CT-CTA HEAD WITH & W/O-POST PROCESS   Final Result      CT angiogram of the [place] within normal limits.        Results for orders placed or performed during the hospital encounter of 09/23/19   CBC WITH DIFFERENTIAL   Result Value Ref Range    WBC 6.9 4.8 - 10.8 K/uL    RBC 5.38 4.70 - 6.10 M/uL    Hemoglobin 15.3 14.0 - 18.0 g/dL    Hematocrit 46.7 42.0 - 52.0 %    MCV 86.8 81.4 - 97.8 fL    MCH 28.4 27.0 - 33.0 pg    MCHC 32.8 (L) 33.7 - 35.3 g/dL    RDW 40.8 35.9 - 50.0 fL    Platelet Count 183 164 - 446 K/uL    MPV 10.6 9.0 - 12.9 fL    Neutrophils-Polys 61.30 44.00 - 72.00 %    Lymphocytes 21.80 (L) 22.00 - 41.00 %    Monocytes 10.80 0.00 - 13.40 %    Eosinophils 5.20 0.00 - 6.90 %    Basophils 0.60 0.00 - 1.80 %    Immature Granulocytes 0.30 0.00 - 0.90 %    Nucleated RBC 0.00 /100 WBC    Neutrophils (Absolute) 4.22 1.82 - 7.42 K/uL    Lymphs (Absolute) 1.50 1.00 - 4.80 K/uL    Monos (Absolute) 0.74 0.00 - 0.85 K/uL    Eos (Absolute) 0.36 0.00 - 0.51 K/uL    Baso (Absolute) 0.04 0.00 - 0.12 K/uL    Immature  Granulocytes (abs) 0.02 0.00 - 0.11 K/uL    NRBC (Absolute) 0.00 K/uL   Comp Metabolic Panel   Result Value Ref Range    Sodium 141 135 - 145 mmol/L    Potassium 4.3 3.6 - 5.5 mmol/L    Chloride 104 96 - 112 mmol/L    Co2 23 20 - 33 mmol/L    Anion Gap 14.0 (H) 0.0 - 11.9    Glucose 110 (H) 65 - 99 mg/dL    Bun 14 8 - 22 mg/dL    Creatinine 0.97 0.50 - 1.40 mg/dL    Calcium 9.4 8.4 - 10.2 mg/dL    AST(SGOT) 47 (H) 12 - 45 U/L    ALT(SGPT) 67 (H) 2 - 50 U/L    Alkaline Phosphatase 96 30 - 99 U/L    Total Bilirubin 0.7 0.1 - 1.5 mg/dL    Albumin 4.5 3.2 - 4.9 g/dL    Total Protein 7.7 6.0 - 8.2 g/dL    Globulin 3.2 1.9 - 3.5 g/dL    A-G Ratio 1.4 g/dL   ESTIMATED GFR   Result Value Ref Range    GFR If African American >60 >60 mL/min/1.73 m 2    GFR If Non African American >60 >60 mL/min/1.73 m 2   EKG   Result Value Ref Range    Report       [facility] Emergency Dept.    Test Date:  2019  Pt Name:    NARCISA SCHWARTZ                 Department: [facility]  MRN:        [id number]                      Room:       [place] [address]  Gender:     Male                         Technician: [id number]  :        1960                   Requested By:LEONEL MEYERS  Order #:    [id number]                    Reading MD: LEONEL MEYERS MD    Measurements  Intervals                                Axis  Rate:       57                           P:          46  VT:         177                          QRS:        -7  QRSD:       89                           T:          25  QT:         453  QTc:        441    Interpretive Statements  Sinus rhythm  Probable left atrial enlargement  Compared to ECG 2019 06:57:39  Sinus bradycardia no longer present    Electronically Signed On 2019 12:44:12 PDT by LEONEL MEYERS MD       Patient was immediately brought back.  He presented yesterday to urgent care with vertiginous symptoms.  He reports that those resolved but he also has some intermittent what appears to be  binocular diplopia.  Cranial nerve exams are normal and he has no other abnormality on neurological exam.  With his history of vertebral artery dissection I was quite concerned about critical vessel occlusion dissection etc.  CT scan with angiogram was performed of the neck and head and both are unremarkable.  I suspect he might have some transient intraocular muscle dysfunction possibly from his underlying vertiginous syndrome.  While he is here his symptoms resolved this is not consistent with a classic TIA.  I recommend they follow-up with his PCP for ongoing management    FINAL IMPRESSION  1.  Vertigo  2.  Binocular diplopia         Electronically signed by: Thompson Galindo, 9/23/2019 10:05 AM

## 2019-09-23 NOTE — PROCEDURES
Comments:  The patient underwent a CPAP titration using the standard montage for measurement of parameters of sleep, respiratory events, movement abnormalities, and heart rate and rhythm.    A microphone was used to monitor snoring.    Interpretation:  Study start time was 09:59:40 PM.  Diagnostic recording time was 7h 48.0m with a total sleep time of 7h 13.5m resulting in a sleep efficiency of 92.63%%.  Sleep latency from the start of the study was 10 minutes and the latency from sleep to REM was 84 minutes.In total,49  arousals were scored for an arousal index of 6.8.    Respiratory:  There were a total of 1 apneas consisting of 0 obstructive apneas, 0 mixed apneas, and 1 central apneas.  A total of 4 hypopneas were scored.The apnea index was 0.14 per hour and the hypopnea index was 0.55 per hour resulting in an overall AHI of 0.69.AHI during rem was 2.3 and AHI while supine was 0.69.    Oximetry:  There was a mean oxygen saturation of 96.0% with a minimum oxygen saturation of 92.0%.  Time spent with oxygen saturations below 89% was 0.0 minutes.    Cardiac:  The highest heart rate seen while awake was 62 BPM while the highest heart rate during sleep was 58 BPM with an average sleeping heart rate of 47 BPM.    Limb Movements:  There were a total of 0 PLMs during sleep, of which 0 were PLMS arousals.  This resulted in a PLMS index of 0.0 and a PLMS arousal index of 0.0.     CPAP was tried from 10 to 11cm H2O.    CPAP Titration:  The PAP titration was initiated with CPAP 10 cm of water and the pressure which was slowly titrated up in an attempt to eliminate sleep disordered breathing and snoring. The final pressure tested during the study was CPAP 11 cm water and at this final pressure the patient was observed in the supine REM sleep stage. The apnea hypopnea index improved to 0.8 per hour and O2 irvin 93%. The average O2 stauration was 96%. He spent 0 % of sleep time below 89% O2 saturation. He did well on the lower  pressure as well. The patient utilized [employer], airfit f20 mask with heated humidification. The CPAP was well-tolerated and there were minimal air leaks. No supplemental oxygen was required.    Impression:  1.  Obstructive sleep apnea   2.  Normal oximetry       Recommendations:  I recommend CPAP 10 cm with [id number] mask. Recommended 30 day compliance download to assess the efficacy of the recommended pressure and compliance for further outpatient monitoring and management of CPAP therapy. In some cases alternative treatment options may prove effective in resolving sleep apnea and these options include upper airway surgery, the use of a dental orthotic or weight loss and positional therapy. Clinical correlation is required. In general patients with sleep apnea are advised to avoid alcohol and sedatives and to not operate a motor vehicle while drowsy and are at a greater risk for cardiovascular disease.

## 2019-09-23 NOTE — ED TRIAGE NOTES
Chief Complaint   Patient presents with   • Lightheadedness     Pt reports went to UC yesterday, DX w/ positional Vertigo   • Diplopia     this am     Wt 97.9 kg (215 lb 13.3 oz)   BMI 31.87 kg/m²

## 2019-09-23 NOTE — PROGRESS NOTES
Subjective:      Pt is a 59 y.o. male who presents with Vertigo (x1 day. Vertigo.)            HPI  This is a new problem. Pt notes left sided ear pain and dizziness with movements of head Left>right x 1 day. Pt notes this has occurred in the past due to ear infections. Pt has not taken any Rx medications for this condition. Pt states the pain is a 5/10, aching in nature and worse at night. Pt notes difficulty with maintaining balance. Pt denies CP, SOB, NVD, paresthesias, headaches,  change in vision, hives, or other joint pain. The pt's medication list, problem list, and allergies have been evaluated and reviewed during today's visit.    PMH:  Past Medical History:   Diagnosis Date   • Allergy    • Chronic back pain    • Chronic pain syndrome    • Diverticulitis    • Dyslipidemia    • Hypertension    • Kidney stones    • Reactive airways dysfunction syndrome (HCC) 6/3/2019   • Sleep apnea    • Vertigo        PSH:  Past Surgical History:   Procedure Laterality Date   • OTHER      deviated septum repair   • OTHER      L5-S1 surgery       Fam Hx:    family history includes Colon Cancer in his mother; Hypertension in his brother; Other in his brother and father.  Family Status   Relation Name Status   • Fa     • Mo  (Not Specified)   • Bro  (Not Specified)   • Bro  (Not Specified)   • Neg Hx  (Not Specified)       Soc HX:  Social History     Socioeconomic History   • Marital status:      Spouse name: Not on file   • Number of children: Not on file   • Years of education: Not on file   • Highest education level: Not on file   Occupational History   • Not on file   Social Needs   • Financial resource strain: Not on file   • Food insecurity:     Worry: Not on file     Inability: Not on file   • Transportation needs:     Medical: Not on file     Non-medical: Not on file   Tobacco Use   • Smoking status: Never Smoker   • Smokeless tobacco: Never Used   Substance and Sexual Activity   • Alcohol use: Yes      Frequency: Monthly or less     Drinks per session: 1 or 2     Comment: VERY RARE   • Drug use: No   • Sexual activity: Not on file   Lifestyle   • Physical activity:     Days per week: Not on file     Minutes per session: Not on file   • Stress: Not on file   Relationships   • Social connections:     Talks on phone: Not on file     Gets together: Not on file     Attends Restorationist service: Not on file     Active member of club or organization: Not on file     Attends meetings of clubs or organizations: Not on file     Relationship status: Not on file   • Intimate partner violence:     Fear of current or ex partner: Not on file     Emotionally abused: Not on file     Physically abused: Not on file     Forced sexual activity: Not on file   Other Topics Concern   • Not on file   Social History Narrative   • Not on file         Medications:    Current Outpatient Medications:   •  finasteride (PROSCAR) 5 MG Tab, , Disp: , Rfl:   •  tamsulosin (FLOMAX) 0.4 MG capsule, Take 0.4 mg by mouth every day., Disp: , Rfl: 0  •  prochlorperazine (COMPAZINE) 10 MG Tab, Take 1 Tab by mouth every 6 hours as needed (vertigo)., Disp: 30 Tab, Rfl: 0  •  cefdinir (OMNICEF) 300 MG Cap, Take 1 Cap by mouth 2 times a day for 7 days., Disp: 14 Cap, Rfl: 0  •  amLODIPine (NORVASC) 10 MG Tab, Take 1 Tab by mouth every day., Disp: 30 Tab, Rfl: 11  •  morphine ER (MS CONTIN) 15 MG Tab CR tablet, Take 15 mg by mouth 4 times a day., Disp: , Rfl:   •  ibuprofen (MOTRIN) 800 MG Tab, Take 1 Tab by mouth every 8 hours as needed., Disp: 30 Tab, Rfl: 0  •  simvastatin (ZOCOR) 10 MG TABS, Take 10 mg by mouth every evening.  , Disp: , Rfl:   •  hydrocodone/acetaminophen (NORCO)  MG TABS, Take 1 Tab by mouth every 6 hours as needed for Mild Pain. No driving or operation of machinery after taking this medication., Disp: 15 Each, Rfl: 0      Allergies:  Erythromycin; Other food; and Sudafed [pseudoephedrine hcl]    ROS    Constitutional: Negative for  "fever, chills and malaise/fatigue.   HENT: Positive for LEFT ear discomfort, ear fullness and mild hearing loss. Negative for congestion, nosebleeds, sore throat and tinnitus.    Eyes: Negative for blurred vision, double vision and photophobia.   Respiratory: Negative for cough, shortness of breath.    Cardiovascular: Negative for chest pain and palpitations.   Gastrointestinal: Negative for nausea, vomiting, abdominal pain, diarrhea and constipation.   Genitourinary: Negative for dysuria and flank pain.   Musculoskeletal: Negative for joint pain and myalgias.   Skin: Negative for itching and rash.   Neurological: POS for dizziness, NEG tingling, weakness and headaches.   Endo/Heme/Allergies: Does not bruise/bleed easily.   Psychiatric/Behavioral: Negative for depression. The patient is not nervous/anxious.             Objective:     /86   Pulse 63   Temp 36.6 °C (97.8 °F)   Ht 1.753 m (5' 9\")   Wt 95.3 kg (210 lb)   SpO2 97%   BMI 31.01 kg/m²      Physical Exam  Constitutional: PT is oriented to person, place, and time.   HENT:   Head: Normocephalic and atraumatic.   Right Ear: Hearing, tympanic membrane, external ear and ear canal normal.   Left Ear: Hearing, external ear and ear canal normal. Tympanic membrane is erythematous and bulging. A middle ear effusion is present. +BPPV on left with Pos [place] on Left>Right  Nose: Nose normal.   Mouth/Throat: Oropharynx is clear and moist. No oropharyngeal exudate.   Eyes: Conjunctivae normal and EOM are normal. Pupils are equal, round, and reactive to light.   Neck: Normal range of motion. Neck supple. No thyromegaly present.   Cardiovascular: Normal rate, regular rhythm, normal heart sounds and intact distal pulses.  Exam reveals no gallop and no friction rub.    No murmur heard.  Pulmonary/Chest: Effort normal and breath sounds normal. No respiratory distress. PT has no wheezes. PT has no rales. PT exhibits no tenderness.   Abdominal: Soft. Bowel " sounds are normal. PT exhibits no distension and no mass. There is no tenderness. There is no rebound and no guarding.   Musculoskeletal: Normal range of motion. PT exhibits no edema and no tenderness.   Neurological: PT is alert and oriented to person, place, and time. No cranial nerve deficit.   Skin: Skin is warm and dry. No rash noted. No erythema.   Psychiatric: PT has a normal mood and affect. PT behavior is normal. Judgment and thought content normal.             Assessment/Plan:     1. Acute suppurative otitis media of left ear without spontaneous rupture of tympanic membrane, recurrence not specified    - cefdinir (OMNICEF) 300 MG Cap; Take 1 Cap by mouth 2 times a day for 7 days.  Dispense: 14 Cap; Refill: 0    2. BPPV (benign paroxysmal positional vertigo), bilateral    - prochlorperazine (COMPAZINE) 10 MG Tab; Take 1 Tab by mouth every 6 hours as needed (vertigo).  Dispense: 30 Tab; Refill: 0    [place] Left>Right POSITIVE  Strict ER precautions given  Pt declines CT Head at ER today, will go if symptoms worsen he states  Rest, fluids encouraged.  AVS with medical info given.  Pt was in full understanding and agreement with the plan.  Differential diagnosis, natural history, supportive care, and indications for immediate follow-up discussed. All questions answered. Patient agrees with the plan of care.  Follow-up as needed if symptoms worsen or fail to improve.

## 2019-09-23 NOTE — ED NOTES
"Pt reports \"room has stopped spinning, but I still feel off.\"  Pt reports has been taking prescriptions as ordered.    "

## 2019-09-24 ENCOUNTER — TELEPHONE (OUTPATIENT)
Dept: PULMONOLOGY | Facility: HOSPICE | Age: 59
End: 2019-09-24

## 2019-09-24 DIAGNOSIS — G47.33 OSA (OBSTRUCTIVE SLEEP APNEA): ICD-10-CM

## 2019-09-24 NOTE — TELEPHONE ENCOUNTER
----- Message from SCHUYLER Nagel sent at 9/24/2019 12:37 PM PDT -----  Will you please let pt know his SS shows he did best with CPAP pressure of 10. I will send order to adjust his current pressure and we will review compliance at his next visit.

## 2019-09-30 NOTE — TELEPHONE ENCOUNTER
Jordyn Gutierrez, Med Ass't  You 7 hours ago (8:49 AM)      Can you please call pt again!   Thank you      Unable to reach patient , LVM to call back with any questions regarding previous voicemail

## 2019-12-03 ENCOUNTER — OFFICE VISIT (OUTPATIENT)
Dept: PULMONOLOGY | Facility: HOSPICE | Age: 59
End: 2019-12-03
Payer: COMMERCIAL

## 2019-12-03 VITALS
BODY MASS INDEX: 32.14 KG/M2 | HEART RATE: 58 BPM | WEIGHT: 217 LBS | HEIGHT: 69 IN | SYSTOLIC BLOOD PRESSURE: 134 MMHG | DIASTOLIC BLOOD PRESSURE: 70 MMHG | OXYGEN SATURATION: 95 %

## 2019-12-03 DIAGNOSIS — R06.02 SOB (SHORTNESS OF BREATH): ICD-10-CM

## 2019-12-03 DIAGNOSIS — G47.33 OSA (OBSTRUCTIVE SLEEP APNEA): ICD-10-CM

## 2019-12-03 DIAGNOSIS — R00.1 BRADYCARDIA: ICD-10-CM

## 2019-12-03 PROCEDURE — 99214 OFFICE O/P EST MOD 30 MIN: CPT | Performed by: NURSE PRACTITIONER

## 2019-12-03 RX ORDER — LISINOPRIL 10 MG/1
10 TABLET ORAL DAILY
COMMUNITY
Start: 2019-11-28

## 2019-12-03 ASSESSMENT — ENCOUNTER SYMPTOMS
PSYCHIATRIC NEGATIVE: 1
CONSTITUTIONAL NEGATIVE: 1
EYE DISCHARGE: 0
CARDIOVASCULAR NEGATIVE: 1
RESPIRATORY NEGATIVE: 1
NEUROLOGICAL NEGATIVE: 1
BRUISES/BLEEDS EASILY: 0
EYE PAIN: 0
GASTROINTESTINAL NEGATIVE: 1
MUSCULOSKELETAL NEGATIVE: 1

## 2019-12-03 NOTE — PROGRESS NOTES
Chief Complaint   Patient presents with   • Shortness of Breath     Last seen on 06/03/2019-Rashel          HPI: This patient is a 59 y.o. male, who presents for follow-up shortness of breath.     He had extensive work-up for his shortness of breath.  Pulmonary work-up was unremarkable.  PFTs were vernon, chest x-ray was clear. he was referred to cardiology.  Echocardiogram was unremarkable with the exception of mild pulmonary hypertension, LVEF of 70% and RVSP 30 mmHg.  Holter monitor showed sinus bradycardia.  He was taken off his verapamil and changed to lisinopril and amlodipine with essentially complete resolution of his shortness of breath.  He denies dry cough with lisinopril.  He has been feeling well.  Cardiac stress test was also unremarkable although he had slightly sub-maximal stress 81% predicted heart rate.    In regards to CHASITY, PSG 2010 indicates moderate obstructive sleep apnea with an AHI of 25.7, minimum oxygen saturation of 83 %. He has had 2 titrations in the past showing adequate treatment with CPAP, despite elevated AHI on download. His OPO showed adequate saturations on CPAP despite elevated AHI. He is compliant with auto CPAP 10-16 cm H2O.    He had a more recent titration again showing adequate titration to CPAP 10. We will change his pressures if not already done.  He has been compliant with therapy and continues to use his machine nightly.  He benefits greatly from therapy.  Denies EDS or a.m. headache.    Past Medical History:   Diagnosis Date   • Allergy    • Chronic back pain    • Chronic pain syndrome    • Diverticulitis    • Dyslipidemia    • Hypertension    • Kidney stones    • Reactive airways dysfunction syndrome (HCC) 6/3/2019   • Sleep apnea    • Vertigo        Social History     Tobacco Use   • Smoking status: Never Smoker   • Smokeless tobacco: Never Used   Substance Use Topics   • Alcohol use: Yes     Frequency: Monthly or less     Drinks per session: 1 or 2     Comment: VERY  "RARE   • Drug use: No       Family History   Problem Relation Age of Onset   • Other Father         leukemia   • Colon Cancer Mother    • Hypertension Brother    • Other Brother         dyslipidemia   • Sleep Apnea Neg Hx        Immunization History   Administered Date(s) Administered   • Influenza (IM) Preservative Free 10/08/2010, 10/19/2012   • Influenza Seasonal Injectable 10/19/2011, 09/21/2013, 10/14/2014   • Influenza TIV (IM) 10/01/2017   • Influenza Vaccine Quad Inj (Pf) 10/06/2015, 09/27/2019   • Influenza Vaccine Quad Inj (Preserved) 11/14/2016   • Tdap Vaccine 10/26/2014       Current medications as of today   Current Outpatient Medications   Medication Sig Dispense Refill   • lisinopril (PRINIVIL) 10 MG Tab      • finasteride (PROSCAR) 5 MG Tab      • tamsulosin (FLOMAX) 0.4 MG capsule Take 0.4 mg by mouth every day.  0   • amLODIPine (NORVASC) 10 MG Tab Take 1 Tab by mouth every day. 30 Tab 11   • morphine ER (MS CONTIN) 15 MG Tab CR tablet Take 15 mg by mouth 4 times a day.     • ibuprofen (MOTRIN) 800 MG Tab Take 1 Tab by mouth every 8 hours as needed. 30 Tab 0   • simvastatin (ZOCOR) 10 MG TABS Take 10 mg by mouth every evening.       • hydrocodone/acetaminophen (NORCO)  MG TABS Take 1 Tab by mouth every 6 hours as needed for Mild Pain. No driving or operation of machinery after taking this medication. 15 Each 0   • prochlorperazine (COMPAZINE) 10 MG Tab Take 1 Tab by mouth every 6 hours as needed (vertigo). (Patient not taking: Reported on 12/3/2019) 30 Tab 0     No current facility-administered medications for this visit.        Allergies: Erythromycin; Other food; and Sudafed [pseudoephedrine hcl]    /70 (BP Location: Left arm, Patient Position: Sitting, BP Cuff Size: Large adult)   Pulse (!) 58   Ht 1.753 m (5' 9\")   Wt 98.4 kg (217 lb)   SpO2 95%       Review of Systems   Constitutional: Negative.    HENT: Negative.    Eyes: Negative for pain and discharge.   Respiratory: " Negative.    Cardiovascular: Negative.    Gastrointestinal: Negative.    Musculoskeletal: Negative.    Skin: Negative.    Neurological: Negative.    Endo/Heme/Allergies: Negative for environmental allergies. Does not bruise/bleed easily.   Psychiatric/Behavioral: Negative.        Physical Exam   Constitutional: He is oriented to person, place, and time and well-developed, well-nourished, and in no distress.   HENT:   Head: Normocephalic and atraumatic.   Eyes: Pupils are equal, round, and reactive to light.   Neck: Normal range of motion. Neck supple. No tracheal deviation present.   Cardiovascular: Normal rate, regular rhythm, normal heart sounds and intact distal pulses.   Pulmonary/Chest: Effort normal and breath sounds normal.   Musculoskeletal: Normal range of motion.   Neurological: He is alert and oriented to person, place, and time. Gait normal.   Skin: Skin is warm and dry.   Psychiatric: Mood, memory, affect and judgment normal.       Diagnoses/Plan:    1. CHASITY (obstructive sleep apnea)  Stable, continue CPAP therapy nightly, clean mask and tubing weekly, replace supplies as insurance will allow.  I will request compliance download from his [employer].  We will change his pressure from auto setting to a set pressure of 10 however if he feels this is uncomfortable we can go back to an auto pressure.  He is instructed to call if needed.  He will follow-up annually at the sleep clinic.  - DME Other    2. SOB (shortness of breath)  Resolved with the discontinuation of verapamil    3. Bradycardia  Improved with discontinuation of verapamil, HTN managed by PCP    No need to follow-up at pulmonary clinic at this time unless symptoms recur            This dictation was created using voice recognition software. The accuracy of the dictation is limited to the abilities of the software. I expect there may be some errors of grammar and possibly content.

## 2020-01-21 ENCOUNTER — APPOINTMENT (RX ONLY)
Dept: URBAN - METROPOLITAN AREA CLINIC 35 | Facility: CLINIC | Age: 60
Setting detail: DERMATOLOGY
End: 2020-01-21

## 2020-01-21 DIAGNOSIS — Z71.89 OTHER SPECIFIED COUNSELING: ICD-10-CM

## 2020-01-21 DIAGNOSIS — D22 MELANOCYTIC NEVI: ICD-10-CM

## 2020-01-21 DIAGNOSIS — L81.4 OTHER MELANIN HYPERPIGMENTATION: ICD-10-CM

## 2020-01-21 DIAGNOSIS — L82.1 OTHER SEBORRHEIC KERATOSIS: ICD-10-CM

## 2020-01-21 PROBLEM — D22.61 MELANOCYTIC NEVI OF RIGHT UPPER LIMB, INCLUDING SHOULDER: Status: ACTIVE | Noted: 2020-01-21

## 2020-01-21 PROCEDURE — ? COUNSELING

## 2020-01-21 PROCEDURE — 99213 OFFICE O/P EST LOW 20 MIN: CPT

## 2020-01-21 ASSESSMENT — LOCATION ZONE DERM: LOCATION ZONE: ARM

## 2020-01-21 ASSESSMENT — LOCATION SIMPLE DESCRIPTION DERM: LOCATION SIMPLE: RIGHT SHOULDER

## 2020-01-21 ASSESSMENT — LOCATION DETAILED DESCRIPTION DERM: LOCATION DETAILED: RIGHT POSTERIOR SHOULDER

## 2020-05-01 DIAGNOSIS — I10 ESSENTIAL HYPERTENSION: Primary | ICD-10-CM

## 2020-05-01 RX ORDER — AMLODIPINE BESYLATE 10 MG/1
TABLET ORAL
Qty: 90 TAB | Refills: 1 | Status: SHIPPED | OUTPATIENT
Start: 2020-05-01 | End: 2020-12-22

## 2020-12-07 ENCOUNTER — OFFICE VISIT (OUTPATIENT)
Dept: SLEEP MEDICINE | Facility: MEDICAL CENTER | Age: 60
End: 2020-12-07
Payer: COMMERCIAL

## 2020-12-07 VITALS
SYSTOLIC BLOOD PRESSURE: 120 MMHG | HEART RATE: 50 BPM | HEIGHT: 69 IN | DIASTOLIC BLOOD PRESSURE: 70 MMHG | WEIGHT: 190 LBS | OXYGEN SATURATION: 97 % | BODY MASS INDEX: 28.14 KG/M2 | RESPIRATION RATE: 16 BRPM

## 2020-12-07 DIAGNOSIS — G47.33 OSA ON CPAP: ICD-10-CM

## 2020-12-07 DIAGNOSIS — R00.1 BRADYCARDIA: ICD-10-CM

## 2020-12-07 PROCEDURE — 99213 OFFICE O/P EST LOW 20 MIN: CPT | Performed by: PHYSICIAN ASSISTANT

## 2020-12-07 ASSESSMENT — ENCOUNTER SYMPTOMS
COUGH: 0
FEVER: 0
SPUTUM PRODUCTION: 0
INSOMNIA: 0
PALPITATIONS: 0
ORTHOPNEA: 0
HEADACHES: 0
TREMORS: 0
SORE THROAT: 0
SINUS PAIN: 0
DIZZINESS: 0
WEIGHT LOSS: 1
SHORTNESS OF BREATH: 1
CHILLS: 0
WHEEZING: 0
HEARTBURN: 0

## 2020-12-07 ASSESSMENT — FIBROSIS 4 INDEX: FIB4 SCORE: 1.95

## 2020-12-07 NOTE — PROGRESS NOTES
CC: Concern regarding CPAP settings.    HPI:  Mere Monge is a 60 y.o. year old male here today for follow-up on shortness of breath and CHASITY. Last seen in clinic 12/3/2019 by MAXINE Trevino.  Patient is a never smoker.  Reports shortness of breath has been attributed to low heart rate when it is running less than 50 or at 50 bpm.  Reports slow recovery with exercise but resolves with this adaption.  Denies reactive airway dysfunction.    Pertinent past medical history includes allergies, chronic back pain, kidney stones, hypertension and vertigo.    Reviewed in clinic vitals including blood pressure 120/70, heart rate of 50, O2 sat of 97% on room air and BMI of 28.06 kg/m².    Reviewed home medication regimen which includes lisinopril.  Patient denies cough.    Reviewed most recent imaging including echocardiogram obtained 7/25/2019 demonstrated normal stress echo with slightly submaximal stress (81% max predicted heart rate).  No arrhythmia.    Reviewed sleep study obtained 9/20/2019 demonstrating no desaturations on CPAP of 10 cm H2O pressure.  AHI of 0.14/h, arousal index 6.8.  Highest heart rate 62 while awake and 58 while asleep.    Unable to retrieve data wirelessly and patient has unit without chip.  Requests repeat sleep study due to 25 pound weight loss intentional last 2 months to verify continued need and appropriateness of settings.  Patient requests this provided he can obtain prior to end of year.    Review of Systems   Constitutional: Positive for weight loss (intended, >25 lbs over 2 months ). Negative for chills, fever and malaise/fatigue.   HENT: Negative for congestion, hearing loss, nosebleeds, sinus pain, sore throat and tinnitus.    Eyes:        Pres glasses   Respiratory: Positive for shortness of breath (overexertion, slow recovery HR<=50). Negative for cough, sputum production (overexertion, slow reovery ) and wheezing.    Cardiovascular: Negative for chest pain, palpitations,  orthopnea and leg swelling.   Gastrointestinal: Negative for heartburn.        No dentures, no difficulty swallowing, history of stroke in 89, periodic microaspiration    Neurological: Negative for dizziness, tremors and headaches.   Psychiatric/Behavioral: The patient does not have insomnia.        Past Medical History:   Diagnosis Date   • Allergy    • Chronic back pain    • Chronic pain syndrome    • Diverticulitis    • Dyslipidemia    • Hypertension    • Kidney stones    • Reactive airways dysfunction syndrome (HCC) 6/3/2019   • Sleep apnea    • Vertigo        Past Surgical History:   Procedure Laterality Date   • OTHER      deviated septum repair   • OTHER      L5-S1 surgery       Family History   Problem Relation Age of Onset   • Other Father         leukemia   • Colon Cancer Mother    • Hypertension Brother    • Other Brother         dyslipidemia   • Sleep Apnea Neg Hx        Social History     Socioeconomic History   • Marital status:      Spouse name: Not on file   • Number of children: Not on file   • Years of education: Not on file   • Highest education level: Not on file   Occupational History   • Not on file   Social Needs   • Financial resource strain: Not on file   • Food insecurity     Worry: Not on file     Inability: Not on file   • Transportation needs     Medical: Not on file     Non-medical: Not on file   Tobacco Use   • Smoking status: Never Smoker   • Smokeless tobacco: Never Used   Substance and Sexual Activity   • Alcohol use: Yes     Frequency: Monthly or less     Drinks per session: 1 or 2     Comment: VERY RARE   • Drug use: No   • Sexual activity: Not on file   Lifestyle   • Physical activity     Days per week: Not on file     Minutes per session: Not on file   • Stress: Not on file   Relationships   • Social connections     Talks on phone: Not on file     Gets together: Not on file     Attends Congregation service: Not on file     Active member of club or organization: Not on file  "    Attends meetings of clubs or organizations: Not on file     Relationship status: Not on file   • Intimate partner violence     Fear of current or ex partner: Not on file     Emotionally abused: Not on file     Physically abused: Not on file     Forced sexual activity: Not on file   Other Topics Concern   • Not on file   Social History Narrative   • Not on file       Allergies as of 12/07/2020 - Reviewed 12/07/2020   Allergen Reaction Noted   • Erythromycin Vomiting 08/30/2012   • Other food  10/17/2016   • Sudafed [pseudoephedrine hcl] Palpitations 08/30/2012        @Vital signs for this encounter:  Vitals:    12/07/20 0751   Height: 1.753 m (5' 9\")   Weight: 86.2 kg (190 lb)   Weight % change since last entry.: 0 %   BP: 120/70   Pulse: (!) 50   BMI (Calculated): 28.06   Resp: 16       Current medications as of today   Current Outpatient Medications   Medication Sig Dispense Refill   • amLODIPine (NORVASC) 10 MG Tab TAKE 1 TABLET BY MOUTH EVERY DAY 90 Tab 1   • lisinopril (PRINIVIL) 10 MG Tab      • finasteride (PROSCAR) 5 MG Tab      • morphine ER (MS CONTIN) 15 MG Tab CR tablet Take 15 mg by mouth 4 times a day.     • ibuprofen (MOTRIN) 800 MG Tab Take 1 Tab by mouth every 8 hours as needed. 30 Tab 0   • hydrocodone/acetaminophen (NORCO)  MG TABS Take 1 Tab by mouth every 6 hours as needed for Mild Pain. No driving or operation of machinery after taking this medication. 15 Each 0   • tamsulosin (FLOMAX) 0.4 MG capsule Take 0.4 mg by mouth every day.  0   • prochlorperazine (COMPAZINE) 10 MG Tab Take 1 Tab by mouth every 6 hours as needed (vertigo). (Patient not taking: Reported on 12/3/2019) 30 Tab 0   • simvastatin (ZOCOR) 10 MG TABS Take 10 mg by mouth every evening.         No current facility-administered medications for this visit.          Physical Exam:   Gen:           Alert and oriented, No apparent distress. Mood and affect appropriate, normal interaction with provider.  Eyes:          sclere " white, conjunctive moist.  Hearing:     Grossly intact.  Dentition:    Good dentition.  Oropharynx:   Tongue normal, posterior pharynx without erythema or exudate.  Neck:        Supple, trachea midline, no masses.  Respiratory Effort: No intercostal retractions or use of accessory muscles.   Lung Auscultation:      Clear to auscultation bilaterally; no rales, rhonchi or wheezing.  CV:            Regular rate and rhythm. No edema. No murmurs, rubs or gallops.  Digits, Nails, Ext: No clubbing, cyanosis, petechiae, or nodes.   Skin:        No rashes, lesions or ulcers noted on exposed skin surfaces.                     Assessment:  1. CHASITY on CPAP  Polysomnography Titration   2. Bradycardia     3. BMI 28.0-28.9,adult         Immunizations:    Flu: 9/27/2019  Pneumovax 23: Deferred  Prevnar 13: Deferred    Plan:     60 y.o. year old male here today for follow-up on shortness of breath and CHASITY. Last seen in clinic 12/3/2019 by MAXINE Trevino.  Patient is a never smoker.  Reports shortness of breath has been attributed to low heart rate when it is running less than 50 or at 50 bpm.  Reports slow recovery with exercise but resolves with this adaption.  Denies reactive airway dysfunction.    Pertinent past medical history includes allergies, chronic back pain, kidney stones, hypertension and vertigo.    Reviewed in clinic vitals including blood pressure 120/70, heart rate of 50, O2 sat of 97% on room air and BMI of 28.06 kg/m².    Elevated BMI: This is down 25 pounds intentionally in the last 2 months.  Support and encouragement given.      Bradycardia: Source attributed for exertional dyspnea.  Denies current symptoms.  Monitor as needed.    Reviewed home medication regimen which includes lisinopril.  Patient denies cough.    Reviewed most recent imaging including echocardiogram obtained 7/25/2019 demonstrated normal stress echo with slightly submaximal stress (81% max predicted heart rate).  No  arrhythmia.    Reviewed sleep study obtained 9/20/2019 demonstrating no desaturations on CPAP of 10 cm H2O pressure.  AHI of 0.14/h, arousal index 6.8.  Highest heart rate 62 while awake and 58 while asleep.    Unable to retrieve data wirelessly and patient has unit without chip.  Requests repeat sleep study due to 25 pound weight loss intentional last 2 months to verify continued need and appropriateness of settings.  Patient requests this provided he can obtain prior to end of year.    CHASITY on CPAP: Repeat titration     This dictation was created using voice recognition software. The accuracy of the dictation is limited to the abilities of the software. I expect there may be some errors of grammar and possibly content.

## 2020-12-07 NOTE — PATIENT INSTRUCTIONS
1-reviewed covid 19 precautions, wear mask in public, social distancing, frequent handwashing, got flu shot  2-weight loss, intentional about 25 pounds last two months  3-unable to retrieve data wirelessly and no chip, will request access Dileep  4-repeat sleep study if slot available this year  5-follow up in one year for CHASITY (denies any asthma issues at all)

## 2020-12-19 ENCOUNTER — PATIENT MESSAGE (OUTPATIENT)
Dept: SLEEP MEDICINE | Facility: MEDICAL CENTER | Age: 60
End: 2020-12-19

## 2020-12-19 DIAGNOSIS — G47.33 OSA ON CPAP: ICD-10-CM

## 2020-12-21 DIAGNOSIS — I10 ESSENTIAL HYPERTENSION: ICD-10-CM

## 2020-12-21 RX ORDER — ZOLPIDEM TARTRATE 5 MG/1
5 TABLET ORAL NIGHTLY PRN
Qty: 2 TAB | Refills: 0 | Status: SHIPPED | OUTPATIENT
Start: 2020-12-21 | End: 2020-12-22

## 2020-12-21 NOTE — TELEPHONE ENCOUNTER
From: Mere Monge  To: Deborah Martinez P.A.-C.  Sent: 12/19/2020 9:56 AM PST  Subject: Procedure Question    For the sleep study I have scheduled on dec 26, normally I get prescribed an ambien to help get to sleep. Can you provide me with that prescription, I use the [facility] pharmacy on [facility]. [phone]

## 2020-12-22 RX ORDER — AMLODIPINE BESYLATE 10 MG/1
TABLET ORAL
Qty: 90 TAB | Refills: 0 | Status: SHIPPED | OUTPATIENT
Start: 2020-12-22

## 2020-12-26 ENCOUNTER — SLEEP STUDY (OUTPATIENT)
Dept: SLEEP MEDICINE | Facility: MEDICAL CENTER | Age: 60
End: 2020-12-26
Attending: PHYSICIAN ASSISTANT
Payer: COMMERCIAL

## 2020-12-26 DIAGNOSIS — G47.33 OSA ON CPAP: ICD-10-CM

## 2020-12-28 PROCEDURE — 95811 POLYSOM 6/>YRS CPAP 4/> PARM: CPT | Performed by: INTERNAL MEDICINE

## 2020-12-28 NOTE — PROCEDURES
Comments:  The patient underwent a CPAP titration using the standard montage for measurement of parameters of sleep, respiratory events, movement abnormalities, and heart rate and rhythm.   A microphone was used to monitor snoring.  Interpretation:  Study start time was 10:33:00 PM. Diagnostic recording time was 7h 44.5m with a total sleep time of 6h 13.0m resulting in a sleep efficiency of 80.30%%.   Sleep latency from the start of the study was 20 minutes and the latency from sleep to REM was 67 minutes.  In total,235 arousals were scored for an arousal index of 37.8.  Respiratory:  There were a total of 16 apneas consisting of 9 obstructive apneas, 0 mixed apneas, and 7 central apneas. A total of 63 hypopneas were scored.  The apnea index was 2.57 per hour and the hypopnea index was 10.13 per hour resulting in an overall AHI of 12.71.  AHI during rem was 37.5 and AHI while supine was 12.71.  Oximetry:  There was a mean oxygen saturation of 96.0% with a minimum oxygen saturation of 90.0%. Time spent with oxygen saturations below 89% was 0.0 minutes.  Cardiac:  The highest heart rate seen while awake was 73 BPM while the highest heart rate during sleep was 54 BPM with an average sleeping heart rate of 45 BPM.  Limb Movements:  There were a total of 0 PLMs during sleep, of which 0 were PLMS arousals. This resulted in a PLMS index of 0.0 and a PLMS arousal index of 0.0.  CPAP was tried from 5 to 16cm H2O.  BiPAP was tried from 18/14 to 21/17cm H2O.    RECORDING TECHNIQUE:       After the scalp was prepared, gold plated electrodes were applied to the scalp according to the International 10-20 System. EEG (electroencephalogram) was continuously monitored from the O1-M2, O2-M1, C3-M2, C4-M1, F3-M2, and F4-M1.   EOGs (electrooculograms) were monitored by electrodes placed at the left and right outer canthi.  Chin EMG (electromyogram) was monitored by electrodes placed on the mentalis and sub-mentalis muscles.  Nasal and  oral airflow were monitored using a triple port thermocouple as well as oronasal pressure transducer.  Respiratory effort was measured by inductive plethysmography technology employing abdominal and thoracic belts.  Blood oxygen saturation and pulse were monitored by pulse oximetry.  Heart rhythm was monitored by surface electrocardiogram.  Leg EMG was studied using surface electrodes placed on left and right anterior tibialis.  A microphone was used to monitor tracheal sounds and snoring.  Body position was monitored and documented by technician observation      SUMMARY:    This was an overnight positive airway pressure titration polysomnogram.  The patient chose to use a large air fit F 10 mask and heated humidification.     The total recording time was 464 minutes, the sleep period time was 443 minutes, and the total sleep time was 373 minutes.  The patient's sleep efficiency was 80.30% which is reduced.  The patient experienced 2 REM period(s).    The sleep stage durations revealed 70.5 minutes of wake after sleep onset (WASO), 50.5 minutes of N1 sleep, to 91.0 minutes of N2 sleep, 15.5 minutes of N3 sleep, and 16.0 minutes of REM.    The latency to sleep was 20 minutes which is normal.  The latency to REM was 1 hour 7.5 minutes which is normal.  Severe sleep fragmentation occurred.  The arousal index was 37.8.  The Limb Movement with Arousal Index was 0, the Total Limb Movement (isolated) Index was 0.5, and the PLM Series Index was 0.0.    The patient experienced 7 central and 9 obstructive apneas, 0 central and 63 obstructive hypopneas, 16 apneas and hypopneas, and 0 RERAS.  The apnea hypopnea index was 12.7, the RDI was 12.7, the mean event duration was 32.2 seconds, and the longest event lasted 77.8 seconds.  The REM index was 1.6  and the supine index was 12.7.  The apnea hypopnea index represents mild sleep apnea hypopnea syndrome during the PAP titration.    The irvin saturation during sleep was 90% and  the patient spent 0% of the recording with saturations less than or equal to 90%.    During sleep, the minimum heart rate was 40 bpm, the mean heart rate was 45 bpm, and the maximum heart rate was 54 bpm.    The technician initiated treatment with CPAP 5 cmH2O and increased the pressure to a maximum of 16 cmH2O. Given the inconsistent response to CPAP, the technician also tried the patient on bilevel.    The patient did best on CPAP 7 cm water with a resultant AHI of 4.5, a minimum saturation of 90%, and a mean saturation of 95%.  The titration with CPAP at 7 cm water included supine REM sleep.        ASSESSMENT:    Successful CPAP titration to a best pressure of 7 cm water with a resultant AHI of 4.5, a irvin saturation of 90%, a mean saturation of 95%, and the achievement of supine REM sleep.        RECOMMENDATION:    Recommend CPAP 7 cmH2O using a large air fit F 10 mask and heated humidification followed by data card and clinical review in 6-8 weeks.

## 2021-01-26 ENCOUNTER — APPOINTMENT (RX ONLY)
Dept: URBAN - METROPOLITAN AREA CLINIC 35 | Facility: CLINIC | Age: 61
Setting detail: DERMATOLOGY
End: 2021-01-26

## 2021-01-26 DIAGNOSIS — L57.0 ACTINIC KERATOSIS: ICD-10-CM

## 2021-01-26 DIAGNOSIS — L81.4 OTHER MELANIN HYPERPIGMENTATION: ICD-10-CM

## 2021-01-26 DIAGNOSIS — Z71.89 OTHER SPECIFIED COUNSELING: ICD-10-CM

## 2021-01-26 DIAGNOSIS — D22 MELANOCYTIC NEVI: ICD-10-CM

## 2021-01-26 DIAGNOSIS — L82.1 OTHER SEBORRHEIC KERATOSIS: ICD-10-CM

## 2021-01-26 PROBLEM — D22.5 MELANOCYTIC NEVI OF TRUNK: Status: ACTIVE | Noted: 2021-01-26

## 2021-01-26 PROCEDURE — 17000 DESTRUCT PREMALG LESION: CPT

## 2021-01-26 PROCEDURE — ? COUNSELING

## 2021-01-26 PROCEDURE — ? LIQUID NITROGEN

## 2021-01-26 PROCEDURE — 99213 OFFICE O/P EST LOW 20 MIN: CPT | Mod: 25

## 2021-01-26 ASSESSMENT — LOCATION DETAILED DESCRIPTION DERM
LOCATION DETAILED: SUPERIOR THORACIC SPINE
LOCATION DETAILED: INFERIOR THORACIC SPINE
LOCATION DETAILED: RIGHT LATERAL FOREHEAD
LOCATION DETAILED: EPIGASTRIC SKIN
LOCATION DETAILED: RIGHT SUPERIOR MEDIAL UPPER BACK

## 2021-01-26 ASSESSMENT — LOCATION SIMPLE DESCRIPTION DERM
LOCATION SIMPLE: RIGHT UPPER BACK
LOCATION SIMPLE: UPPER BACK
LOCATION SIMPLE: ABDOMEN
LOCATION SIMPLE: RIGHT FOREHEAD

## 2021-01-26 ASSESSMENT — LOCATION ZONE DERM
LOCATION ZONE: TRUNK
LOCATION ZONE: FACE

## 2021-02-01 ENCOUNTER — TELEMEDICINE (OUTPATIENT)
Dept: SLEEP MEDICINE | Facility: MEDICAL CENTER | Age: 61
End: 2021-02-01
Payer: COMMERCIAL

## 2021-02-01 VITALS — WEIGHT: 185 LBS | BODY MASS INDEX: 27.4 KG/M2 | HEIGHT: 69 IN

## 2021-02-01 DIAGNOSIS — G89.29 OTHER CHRONIC PAIN: ICD-10-CM

## 2021-02-01 DIAGNOSIS — G47.33 OSA (OBSTRUCTIVE SLEEP APNEA): ICD-10-CM

## 2021-02-01 DIAGNOSIS — Z78.9 NONSMOKER: ICD-10-CM

## 2021-02-01 PROCEDURE — 99213 OFFICE O/P EST LOW 20 MIN: CPT | Mod: 95,CR | Performed by: NURSE PRACTITIONER

## 2021-02-01 ASSESSMENT — FIBROSIS 4 INDEX: FIB4 SCORE: 1.95

## 2021-02-01 NOTE — PROGRESS NOTES
Virtual Visit: Established Patient   This visit was conducted via Zoom using secure and encrypted videoconferencing technology. The patient was in a private location in the state of [place].    The patient's identity was confirmed and verbal consent was obtained for this virtual visit.    Subjective:   CC:   Chief Complaint   Patient presents with   • Results     SS        Mere Monge is a 60 y.o. male presenting for evaluation and management of:    CHASITY; sleep study results  Last OV 12/7/20 with [facility] for SOB and CHASITY    Titration study 12/26/20 noted successful response to CPAP 7cm with reduced AHI 4.5/hr and O2 irvin 90% with mean spo2 95%. Supine rem sleep achieved. Reviewed with patient and recommendation of adjustment in therapy. Patient has lost weight over the last 2 years. BMI 27.  Current device obtained in 2018.  Compliance card 1/2/21-1/31/21 noted 100% compliance, avg nightly use of 9hr 8min, minimal mask leak with overall AHI 7.6/hr. Reviewed with patient. He is using FFM mask. No AM headaches. He notes consistent energy levels during the day. He feels he is sleeping well if his cat doesn't wake him. Denies cardiac or respiratory symptoms. He notes phlegm after lunch hour and having to cough to clear and then it clears. He denies dysphagia symptoms although hx of stroke and very conscious of that. He denies any changes in health over the last year.    Sleep hx:  PSG 2010 indicates moderate obstructive sleep apnea with an AHI of 25.7, minimum oxygen saturation of 83 %. Repeat titrations in the past showing adequate treatment with CPAP, despite elevated AHI on download. OPO showed adequate saturations on CPAP despite elevated AHI. Previously using auto CPAP 10-16 cm H2O.     ROS in HPI; otherwise negative.    Allergies   Allergen Reactions   • Erythromycin Vomiting   • Other Food      Walnuts     • Sudafed [Pseudoephedrine Hcl] Palpitations     Palpitations.       Current medicines (including changes  "today)  Current Outpatient Medications   Medication Sig Dispense Refill   • amLODIPine (NORVASC) 10 MG Tab TAKE 1 TABLET BY MOUTH EVERY DAY 90 Tab 0   • lisinopril (PRINIVIL) 10 MG Tab      • finasteride (PROSCAR) 5 MG Tab      • tamsulosin (FLOMAX) 0.4 MG capsule Take 0.4 mg by mouth every day.  0   • prochlorperazine (COMPAZINE) 10 MG Tab Take 1 Tab by mouth every 6 hours as needed (vertigo). 30 Tab 0   • morphine ER (MS CONTIN) 15 MG Tab CR tablet Take 15 mg by mouth 4 times a day.     • ibuprofen (MOTRIN) 800 MG Tab Take 1 Tab by mouth every 8 hours as needed. 30 Tab 0   • simvastatin (ZOCOR) 10 MG TABS Take 10 mg by mouth every evening.       • hydrocodone/acetaminophen (NORCO)  MG TABS Take 1 Tab by mouth every 6 hours as needed for Mild Pain. No driving or operation of machinery after taking this medication. 15 Each 0     No current facility-administered medications for this visit.        Patient Active Problem List    Diagnosis Date Noted   • Bradycardia 06/03/2019   • Reactive airways dysfunction syndrome (HCC) 06/03/2019   • SOB (shortness of breath) 05/02/2019   • CHASITY (obstructive sleep apnea) 03/06/2017   • BMI 30.0-30.9,adult 03/06/2017       Family History   Problem Relation Age of Onset   • Other Father         leukemia   • Colon Cancer Mother    • Hypertension Brother    • Other Brother         dyslipidemia   • Sleep Apnea Neg Hx        He  has a past medical history of Allergy, Chronic back pain, Chronic pain syndrome, Diverticulitis, Dyslipidemia, Hypertension, Kidney stones, Reactive airways dysfunction syndrome (HCC) (6/3/2019), Sleep apnea, and Vertigo. He also has no past medical history of Diabetes (HCC).  He  has a past surgical history that includes other and other.       Objective:   Ht 1.753 m (5' 9\")   Wt 83.9 kg (185 lb)   BMI 27.32 kg/m²     Physical Exam:  Constitutional: Alert, no distress, well-groomed.  Skin: No rashes in visible areas.  Eye: Round. Conjunctiva clear, lids " normal. No icterus. Glasses.  ENMT: Lips pink without lesions, good dentition, moist mucous membranes. Phonation normal.  Neck: No masses, no thyromegaly. Moves freely without pain.  Respiratory: Unlabored respiratory effort, no cough or audible wheeze  Psych: Alert and oriented x3, normal affect and mood.       Assessment and Plan:   The following treatment plan was discussed:     1. CHASITY (obstructive sleep apnea)    2. Other chronic pain    3. BMI 27.0-27.9,adult    4. Nonsmoker    Continue CPAP nightly; he will continue to benefit from therapy.  DME other; adjust to CPAP 7cm.  DME mask/supplies.  Discussed sleep hygiene.  F/u with PCP for other health concerns    Follow-up: 3 mos for compliance check after adjustment in pressure, sooner if needed. If symptoms stable at next visit with reduced AHI, may go to annual sleep visit.

## 2021-03-15 DIAGNOSIS — Z23 NEED FOR VACCINATION: ICD-10-CM

## 2021-03-17 ENCOUNTER — IMMUNIZATION (OUTPATIENT)
Dept: FAMILY PLANNING/WOMEN'S HEALTH CLINIC | Facility: IMMUNIZATION CENTER | Age: 61
End: 2021-03-17
Attending: INTERNAL MEDICINE
Payer: COMMERCIAL

## 2021-03-17 DIAGNOSIS — Z23 ENCOUNTER FOR VACCINATION: Primary | ICD-10-CM

## 2021-03-17 DIAGNOSIS — Z23 NEED FOR VACCINATION: ICD-10-CM

## 2021-03-17 PROCEDURE — 0001A PFIZER SARS-COV-2 VACCINE: CPT | Performed by: INTERNAL MEDICINE

## 2021-03-17 PROCEDURE — 91300 PFIZER SARS-COV-2 VACCINE: CPT | Performed by: INTERNAL MEDICINE

## 2021-04-08 ENCOUNTER — IMMUNIZATION (OUTPATIENT)
Dept: FAMILY PLANNING/WOMEN'S HEALTH CLINIC | Facility: IMMUNIZATION CENTER | Age: 61
End: 2021-04-08
Attending: INTERNAL MEDICINE
Payer: COMMERCIAL

## 2021-04-08 DIAGNOSIS — Z23 ENCOUNTER FOR VACCINATION: Primary | ICD-10-CM

## 2021-04-08 PROCEDURE — 91300 PFIZER SARS-COV-2 VACCINE: CPT

## 2021-04-08 PROCEDURE — 0002A PFIZER SARS-COV-2 VACCINE: CPT

## 2021-09-21 ENCOUNTER — APPOINTMENT (RX ONLY)
Dept: URBAN - METROPOLITAN AREA CLINIC 35 | Facility: CLINIC | Age: 61
Setting detail: DERMATOLOGY
End: 2021-09-21

## 2021-09-21 DIAGNOSIS — L82.0 INFLAMED SEBORRHEIC KERATOSIS: ICD-10-CM

## 2021-09-21 DIAGNOSIS — L82.1 OTHER SEBORRHEIC KERATOSIS: ICD-10-CM

## 2021-09-21 PROCEDURE — 17110 DESTRUCTION B9 LES UP TO 14: CPT

## 2021-09-21 PROCEDURE — 99212 OFFICE O/P EST SF 10 MIN: CPT | Mod: 25

## 2021-09-21 PROCEDURE — ? COUNSELING

## 2021-09-21 PROCEDURE — ? LIQUID NITROGEN

## 2021-09-21 ASSESSMENT — LOCATION ZONE DERM
LOCATION ZONE: ARM
LOCATION ZONE: TRUNK

## 2021-09-21 ASSESSMENT — LOCATION DETAILED DESCRIPTION DERM
LOCATION DETAILED: RIGHT INFERIOR LATERAL MIDBACK
LOCATION DETAILED: LEFT PROXIMAL POSTERIOR UPPER ARM

## 2021-09-21 ASSESSMENT — LOCATION SIMPLE DESCRIPTION DERM
LOCATION SIMPLE: LEFT UPPER ARM
LOCATION SIMPLE: RIGHT LOWER BACK

## 2021-09-21 NOTE — PROCEDURE: LIQUID NITROGEN
Consent: The patient's consent was obtained including but not limited to risks of crusting, scabbing, blistering, scarring, darker or lighter pigmentary change, recurrence, incomplete removal and infection.
Add 52 Modifier (Optional): no
Medical Necessity Clause: This procedure was medically necessary because the lesions that were treated were:
Post-Care Instructions: I reviewed with the patient in detail post-care instructions. Patient is to wear sunprotection, and avoid picking at any of the treated lesions. Pt may apply Vaseline to crusted or scabbing areas.
Duration Of Freeze Thaw-Cycle (Seconds): 10
Show Applicator Variable?: Yes
Number Of Freeze-Thaw Cycles: 2 freeze-thaw cycles
Medical Necessity Information: It is in your best interest to select a reason for this procedure from the list below. All of these items fulfill various CMS LCD requirements except the new and changing color options.
Detail Level: Detailed

## 2022-01-26 ENCOUNTER — APPOINTMENT (RX ONLY)
Dept: URBAN - METROPOLITAN AREA CLINIC 35 | Facility: CLINIC | Age: 62
Setting detail: DERMATOLOGY
End: 2022-01-26

## 2022-01-26 DIAGNOSIS — D22 MELANOCYTIC NEVI: ICD-10-CM

## 2022-01-26 DIAGNOSIS — L82.1 OTHER SEBORRHEIC KERATOSIS: ICD-10-CM

## 2022-01-26 DIAGNOSIS — Z71.89 OTHER SPECIFIED COUNSELING: ICD-10-CM

## 2022-01-26 DIAGNOSIS — L81.4 OTHER MELANIN HYPERPIGMENTATION: ICD-10-CM

## 2022-01-26 PROBLEM — D22.5 MELANOCYTIC NEVI OF TRUNK: Status: ACTIVE | Noted: 2022-01-26

## 2022-01-26 PROCEDURE — 99213 OFFICE O/P EST LOW 20 MIN: CPT

## 2022-01-26 PROCEDURE — ? COUNSELING

## 2022-01-26 ASSESSMENT — LOCATION DETAILED DESCRIPTION DERM
LOCATION DETAILED: SUPERIOR THORACIC SPINE
LOCATION DETAILED: INFERIOR THORACIC SPINE
LOCATION DETAILED: RIGHT SUPERIOR MEDIAL UPPER BACK
LOCATION DETAILED: EPIGASTRIC SKIN

## 2022-01-26 ASSESSMENT — LOCATION SIMPLE DESCRIPTION DERM
LOCATION SIMPLE: RIGHT UPPER BACK
LOCATION SIMPLE: ABDOMEN
LOCATION SIMPLE: UPPER BACK

## 2022-01-26 ASSESSMENT — LOCATION ZONE DERM: LOCATION ZONE: TRUNK

## 2023-01-25 ENCOUNTER — APPOINTMENT (RX ONLY)
Dept: URBAN - METROPOLITAN AREA CLINIC 35 | Facility: CLINIC | Age: 63
Setting detail: DERMATOLOGY
End: 2023-01-25

## 2023-01-25 DIAGNOSIS — L81.4 OTHER MELANIN HYPERPIGMENTATION: ICD-10-CM

## 2023-01-25 DIAGNOSIS — L82.1 OTHER SEBORRHEIC KERATOSIS: ICD-10-CM

## 2023-01-25 DIAGNOSIS — D22 MELANOCYTIC NEVI: ICD-10-CM

## 2023-01-25 DIAGNOSIS — Z71.89 OTHER SPECIFIED COUNSELING: ICD-10-CM

## 2023-01-25 PROBLEM — D22.5 MELANOCYTIC NEVI OF TRUNK: Status: ACTIVE | Noted: 2023-01-25

## 2023-01-25 PROCEDURE — 99213 OFFICE O/P EST LOW 20 MIN: CPT

## 2023-01-25 PROCEDURE — ? COUNSELING

## 2023-01-25 ASSESSMENT — LOCATION DETAILED DESCRIPTION DERM
LOCATION DETAILED: SUPERIOR THORACIC SPINE
LOCATION DETAILED: INFERIOR THORACIC SPINE
LOCATION DETAILED: EPIGASTRIC SKIN
LOCATION DETAILED: RIGHT SUPERIOR MEDIAL UPPER BACK

## 2023-01-25 ASSESSMENT — LOCATION SIMPLE DESCRIPTION DERM
LOCATION SIMPLE: ABDOMEN
LOCATION SIMPLE: RIGHT UPPER BACK
LOCATION SIMPLE: UPPER BACK

## 2023-01-25 ASSESSMENT — LOCATION ZONE DERM: LOCATION ZONE: TRUNK

## 2023-07-26 ENCOUNTER — TELEPHONE (OUTPATIENT)
Dept: HEALTH INFORMATION MANAGEMENT | Facility: OTHER | Age: 63
End: 2023-07-26

## 2023-07-31 ENCOUNTER — APPOINTMENT (OUTPATIENT)
Dept: RADIOLOGY | Facility: MEDICAL CENTER | Age: 63
End: 2023-07-31
Attending: FAMILY MEDICINE
Payer: MEDICARE

## 2023-07-31 DIAGNOSIS — M25.531 RIGHT WRIST PAIN: ICD-10-CM

## 2023-07-31 PROCEDURE — 73110 X-RAY EXAM OF WRIST: CPT | Mod: RT

## 2023-09-14 ENCOUNTER — TELEPHONE (OUTPATIENT)
Dept: HEALTH INFORMATION MANAGEMENT | Facility: OTHER | Age: 63
End: 2023-09-14
Payer: MEDICARE

## 2023-09-15 PROBLEM — I10 HYPERTENSION: Status: ACTIVE | Noted: 2023-09-15

## 2023-09-15 PROBLEM — M54.41 CHRONIC RIGHT-SIDED LOW BACK PAIN WITH RIGHT-SIDED SCIATICA: Status: ACTIVE | Noted: 2023-09-15

## 2023-09-15 PROBLEM — J68.3 REACTIVE AIRWAYS DYSFUNCTION SYNDROME (HCC): Status: RESOLVED | Noted: 2019-06-03 | Resolved: 2023-09-15

## 2023-09-15 PROBLEM — E78.5 DYSLIPIDEMIA: Status: ACTIVE | Noted: 2023-09-15

## 2023-09-15 PROBLEM — J44.9 OBSTRUCTIVE LUNG DISEASE (HCC): Status: ACTIVE | Noted: 2023-09-15

## 2023-09-15 PROBLEM — R06.02 SHORTNESS OF BREATH: Status: RESOLVED | Noted: 2019-05-02 | Resolved: 2023-09-15

## 2023-09-15 PROBLEM — I27.20 PULMONARY HYPERTENSION, UNSPECIFIED (HCC): Status: ACTIVE | Noted: 2023-09-15

## 2023-09-15 PROBLEM — G89.29 CHRONIC RIGHT-SIDED LOW BACK PAIN WITH RIGHT-SIDED SCIATICA: Status: ACTIVE | Noted: 2023-09-15

## 2023-09-15 PROBLEM — F11.20 OPIOID DEPENDENCE, DAILY USE (HCC): Status: ACTIVE | Noted: 2023-09-15

## 2024-01-25 ENCOUNTER — APPOINTMENT (RX ONLY)
Dept: URBAN - METROPOLITAN AREA CLINIC 35 | Facility: CLINIC | Age: 64
Setting detail: DERMATOLOGY
End: 2024-01-25

## 2024-01-25 DIAGNOSIS — Z71.89 OTHER SPECIFIED COUNSELING: ICD-10-CM

## 2024-01-25 DIAGNOSIS — D22 MELANOCYTIC NEVI: ICD-10-CM

## 2024-01-25 DIAGNOSIS — L82.1 OTHER SEBORRHEIC KERATOSIS: ICD-10-CM

## 2024-01-25 DIAGNOSIS — L81.4 OTHER MELANIN HYPERPIGMENTATION: ICD-10-CM

## 2024-01-25 PROBLEM — D22.5 MELANOCYTIC NEVI OF TRUNK: Status: ACTIVE | Noted: 2024-01-25

## 2024-01-25 PROCEDURE — 99213 OFFICE O/P EST LOW 20 MIN: CPT

## 2024-01-25 PROCEDURE — ? COUNSELING

## 2024-01-25 ASSESSMENT — LOCATION DETAILED DESCRIPTION DERM
LOCATION DETAILED: SUPERIOR THORACIC SPINE
LOCATION DETAILED: EPIGASTRIC SKIN
LOCATION DETAILED: INFERIOR THORACIC SPINE
LOCATION DETAILED: RIGHT SUPERIOR MEDIAL UPPER BACK
LOCATION DETAILED: LEFT SUPERIOR FOREHEAD

## 2024-01-25 ASSESSMENT — LOCATION ZONE DERM
LOCATION ZONE: FACE
LOCATION ZONE: TRUNK

## 2024-01-25 ASSESSMENT — LOCATION SIMPLE DESCRIPTION DERM
LOCATION SIMPLE: UPPER BACK
LOCATION SIMPLE: LEFT FOREHEAD
LOCATION SIMPLE: RIGHT UPPER BACK
LOCATION SIMPLE: ABDOMEN

## 2024-02-09 ENCOUNTER — HOSPITAL ENCOUNTER (OUTPATIENT)
Dept: LAB | Facility: MEDICAL CENTER | Age: 64
End: 2024-02-09
Attending: FAMILY MEDICINE
Payer: MEDICARE

## 2024-02-09 LAB — PSA SERPL-MCNC: 0.18 NG/ML (ref 0–4)

## 2024-02-09 PROCEDURE — 36415 COLL VENOUS BLD VENIPUNCTURE: CPT

## 2024-02-09 PROCEDURE — 84153 ASSAY OF PSA TOTAL: CPT

## 2024-06-07 ENCOUNTER — HOSPITAL ENCOUNTER (OUTPATIENT)
Facility: MEDICAL CENTER | Age: 64
End: 2024-06-07
Attending: SPECIALIST
Payer: MEDICARE

## 2024-06-07 PROCEDURE — G0480 DRUG TEST DEF 1-7 CLASSES: HCPCS

## 2024-06-16 LAB
6MAM UR CFM-MCNC: <10 NG/ML
CODEINE UR CFM-MCNC: <20 NG/ML
HYDROCODONE UR CFM-MCNC: <20 NG/ML
HYDROMORPHONE UR CFM-MCNC: 45 NG/ML
MORPHINE UR CFM-MCNC: 2598 NG/ML
NORHYDROCODONE UR CFM-MCNC: <20 NG/ML
NOROXYCODONE UR CFM-MCNC: <20 NG/ML
OPIATES UR NOROXYM Q0836: <20 NG/ML
OXYCODONE UR CFM-MCNC: <20 NG/ML
OXYMORPHONE UR CFM-MCNC: <20 NG/ML

## 2024-09-04 ENCOUNTER — PATIENT MESSAGE (OUTPATIENT)
Dept: HEALTH INFORMATION MANAGEMENT | Facility: OTHER | Age: 64
End: 2024-09-04

## 2024-10-16 ENCOUNTER — TELEPHONE (OUTPATIENT)
Dept: HEALTH INFORMATION MANAGEMENT | Facility: OTHER | Age: 64
End: 2024-10-16
Payer: MEDICARE

## 2025-01-23 ENCOUNTER — APPOINTMENT (OUTPATIENT)
Dept: URBAN - METROPOLITAN AREA CLINIC 35 | Facility: CLINIC | Age: 65
Setting detail: DERMATOLOGY
End: 2025-01-23

## 2025-01-23 DIAGNOSIS — L82.0 INFLAMED SEBORRHEIC KERATOSIS: ICD-10-CM

## 2025-01-23 DIAGNOSIS — L81.4 OTHER MELANIN HYPERPIGMENTATION: ICD-10-CM

## 2025-01-23 DIAGNOSIS — D22 MELANOCYTIC NEVI: ICD-10-CM

## 2025-01-23 DIAGNOSIS — L82.1 OTHER SEBORRHEIC KERATOSIS: ICD-10-CM

## 2025-01-23 DIAGNOSIS — Z71.89 OTHER SPECIFIED COUNSELING: ICD-10-CM

## 2025-01-23 PROBLEM — D22.5 MELANOCYTIC NEVI OF TRUNK: Status: ACTIVE | Noted: 2025-01-23

## 2025-01-23 PROCEDURE — 99213 OFFICE O/P EST LOW 20 MIN: CPT | Mod: 25

## 2025-01-23 PROCEDURE — ? LIQUID NITROGEN

## 2025-01-23 PROCEDURE — ? COUNSELING

## 2025-01-23 PROCEDURE — 17110 DESTRUCTION B9 LES UP TO 14: CPT

## 2025-01-23 ASSESSMENT — LOCATION ZONE DERM
LOCATION ZONE: TRUNK
LOCATION ZONE: FACE

## 2025-01-23 ASSESSMENT — LOCATION SIMPLE DESCRIPTION DERM
LOCATION SIMPLE: LEFT FOREHEAD
LOCATION SIMPLE: RIGHT FOREHEAD
LOCATION SIMPLE: UPPER BACK
LOCATION SIMPLE: RIGHT UPPER BACK

## 2025-01-23 ASSESSMENT — LOCATION DETAILED DESCRIPTION DERM
LOCATION DETAILED: INFERIOR THORACIC SPINE
LOCATION DETAILED: SUPERIOR THORACIC SPINE
LOCATION DETAILED: RIGHT SUPERIOR MEDIAL UPPER BACK
LOCATION DETAILED: LEFT SUPERIOR FOREHEAD
LOCATION DETAILED: RIGHT FOREHEAD

## 2025-01-23 NOTE — PROCEDURE: LIQUID NITROGEN
Render Note In Bullet Format When Appropriate: No
Medical Necessity Clause: This procedure was medically necessary because the lesions that were treated were:
Show Aperture Variable?: Yes
Number Of Freeze-Thaw Cycles: 2 freeze-thaw cycles
Duration Of Freeze Thaw-Cycle (Seconds): 10
Medical Necessity Information: It is in your best interest to select a reason for this procedure from the list below. All of these items fulfill various CMS LCD requirements except the new and changing color options.
Detail Level: Detailed
Spray Paint Text: The liquid nitrogen was applied to the skin utilizing a spray paint frosting technique.
Post-Care Instructions: I reviewed with the patient in detail post-care instructions. Patient is to wear sunprotection, and avoid picking at any of the treated lesions. Pt may apply Vaseline to crusted or scabbing areas.
Application Tool (Optional): Cry-AC
Consent: The patient's VERBAL consent was obtained including but not limited to risks of crusting, scabbing, blistering, scarring, darker or lighter pigmentary change, recurrence, incomplete removal and infection.

## 2025-04-01 ENCOUNTER — HOSPITAL ENCOUNTER (OUTPATIENT)
Facility: MEDICAL CENTER | Age: 65
End: 2025-04-01
Payer: MEDICARE

## 2025-04-01 PROCEDURE — G0480 DRUG TEST DEF 1-7 CLASSES: HCPCS

## 2025-04-13 ENCOUNTER — OFFICE VISIT (OUTPATIENT)
Dept: URGENT CARE | Facility: CLINIC | Age: 65
End: 2025-04-13
Payer: MEDICARE

## 2025-04-13 VITALS
SYSTOLIC BLOOD PRESSURE: 130 MMHG | RESPIRATION RATE: 18 BRPM | BODY MASS INDEX: 28.76 KG/M2 | HEIGHT: 69 IN | DIASTOLIC BLOOD PRESSURE: 62 MMHG | OXYGEN SATURATION: 98 % | HEART RATE: 59 BPM | TEMPERATURE: 97.6 F | WEIGHT: 194.2 LBS

## 2025-04-13 DIAGNOSIS — A09 INFECTIOUS DIARRHEA IN ADULT PATIENT: ICD-10-CM

## 2025-04-13 LAB
6MAM UR CFM-MCNC: <10 NG/ML
CODEINE UR CFM-MCNC: <20 NG/ML
HYDROCODONE UR CFM-MCNC: 637 NG/ML
HYDROMORPHONE UR CFM-MCNC: 22 NG/ML
MORPHINE UR CFM-MCNC: 1728 NG/ML
NORHYDROCODONE UR CFM-MCNC: 1003 NG/ML
NOROXYCODONE UR CFM-MCNC: <20 NG/ML
OPIATES UR NOROXYM Q0836: <20 NG/ML
OXYCODONE UR CFM-MCNC: <20 NG/ML
OXYMORPHONE UR CFM-MCNC: <20 NG/ML

## 2025-04-13 PROCEDURE — 3075F SYST BP GE 130 - 139MM HG: CPT | Performed by: STUDENT IN AN ORGANIZED HEALTH CARE EDUCATION/TRAINING PROGRAM

## 2025-04-13 PROCEDURE — 3078F DIAST BP <80 MM HG: CPT | Performed by: STUDENT IN AN ORGANIZED HEALTH CARE EDUCATION/TRAINING PROGRAM

## 2025-04-13 PROCEDURE — 99213 OFFICE O/P EST LOW 20 MIN: CPT | Performed by: STUDENT IN AN ORGANIZED HEALTH CARE EDUCATION/TRAINING PROGRAM

## 2025-04-13 RX ORDER — AZITHROMYCIN 500 MG/1
500 TABLET, FILM COATED ORAL DAILY
Qty: 3 TABLET | Refills: 0 | Status: SHIPPED | OUTPATIENT
Start: 2025-04-13 | End: 2025-04-16

## 2025-04-13 NOTE — PROGRESS NOTES
Urgent Care Visit Note  [facility]    04/13/25    Mere Monge     [id number] [facility] [place] [address]     PCP: Nacho Arrieta     Subjective:     Chief Complaint   Patient presents with    Abdominal Pain     Lower abdominal px, Started Tuesday, with nausea and diarrhea        HPI:  Mere Monge is a 64 y.o. male who presents for diarrhea. He reports that 5 days ago he had an episode of left lower abdominal cramping that resolved after 1 day. However, he has been having 12-15 episodes of watery brown diarrhea per day the last 5 days. No questionable food. No travel. No fever or chills. Able to tolerate PO normally and stay hydrated.     ROS:  ROS     CURRENT MEDICATIONS:  Current Outpatient Medications   Medication Sig Refill Last Dispense    amLODIPine (NORVASC) 10 MG Tab TAKE 1 TABLET BY MOUTH EVERY DAY (Patient taking differently: 5 mg.) 0 Unknown (outside pharmacy)    azithromycin (ZITHROMAX) 500 MG tablet Take 1 Tablet by mouth every day for 3 days. 0 Unknown (outside pharmacy)    hydrocodone/acetaminophen (NORCO)  MG TABS Take 1 Tab by mouth every 6 hours as needed for Mild Pain. No driving or operation of machinery after taking this medication. 0 Unknown (no pharmacy)    ibuprofen (MOTRIN) 200 MG Tab Take 200 mg by mouth every 6 hours as needed.  Unknown (patient-reported)    lisinopril (PRINIVIL) 10 MG Tab 10 mg every day.  Unknown (patient-reported)    morphine ER (MS CONTIN) 15 MG Tab CR tablet Take 15 mg by mouth in the morning, at noon, and at bedtime.  Unknown (patient-reported)    simvastatin (ZOCOR) 10 MG TABS Take 10 mg by mouth every evening.    Unknown (patient-reported)       ALLERGIES:   Allergies   Allergen Reactions    Erythromycin Vomiting    Other Food      Walnuts      Sudafed [Pseudoephedrine Hcl] Palpitations     Palpitations.       PROBLEM LIST:    does not have any pertinent problems on file.    Allergies, Medications, & Tobacco/Substance Use were reconciled by the  "Medical Assistant and reviewed by myself.     Objective:     /62 (BP Location: Left arm, Patient Position: Sitting, BP Cuff Size: Adult)   Pulse (!) 59   Temp 36.4 °C (97.6 °F) (Temporal)   Resp 18   Ht 1.753 m (5' 9\")   Wt 88.1 kg (194 lb 3.2 oz)   SpO2 98%   BMI 28.68 kg/m²     Physical Exam  Constitutional:       Appearance: Normal appearance.   HENT:      Head: Normocephalic and atraumatic.      Right Ear: External ear normal.      Left Ear: External ear normal.      Nose: Nose normal.      Mouth/Throat:      Mouth: Mucous membranes are moist.   Eyes:      Extraocular Movements: Extraocular movements intact.      Pupils: Pupils are equal, round, and reactive to light.   Cardiovascular:      Rate and Rhythm: Normal rate and regular rhythm.      Pulses: Normal pulses.      Heart sounds: Normal heart sounds.   Pulmonary:      Effort: Pulmonary effort is normal.   Abdominal:      General: Abdomen is flat. There is no distension.      Tenderness: There is no abdominal tenderness. There is no right CVA tenderness, left CVA tenderness, guarding or rebound.   Skin:     General: Skin is warm.      Capillary Refill: Capillary refill takes less than 2 seconds.   Neurological:      Mental Status: He is alert and oriented to person, place, and time.      Gait: Gait normal.   Psychiatric:         Mood and Affect: Mood normal.         Behavior: Behavior normal.           Lab Results/POC Test Results         Assessment/Plan:     Patient's history and physical exam consistent with:    Assessment & Plan  Infectious diarrhea in adult patient    Orders:    azithromycin (ZITHROMAX) 500 MG tablet; Take 1 Tablet by mouth every day for 3 days.    Reasonable to trial abx due to level of diarrhea. However, if not improving after 2 weeks, recommend he returns for stool studies. No current abd pain but may need imaging if pain returns or develops other concerning symptoms. Educated on supportive care. Precautions given.     Of " note, took erythromycin in the past and felt some nausea but I do not believe this is true allergy and expect him to tolerate azithromycin.     Discussed differential diagnosis, management options, risks/benefits, and alternatives to planned treatment. Pt expressed understanding and the treatment plan was agreed upon. Questions were encouraged and answered. Pt encouraged to return to urgent care as needed if new or worsening symptoms or if there is no improvement in condition. Pt educated in red flags and indications to immediately call 911 or present to the Emergency Department. Advised the patient to follow-up with the primary care physician for recheck, reevaluation, and further management.    I personally reviewed prior external notes and test results pertinent to today's visit. I have independently reviewed and interpreted all diagnostics ordered during this visit.    Please note that this dictation was created using voice recognition software. I have made a reasonable attempt to correct obvious errors, but I expect that there are errors of grammar and possibly content that I did not discover before finalizing the note.    This note was electronically signed by Jeff Washington MD

## 2025-04-13 NOTE — PATIENT INSTRUCTIONS
Thank you for trusting [facility] for your medical care today. You were seen by Jeff Washington MD. We hope that we were able to answer all of your questions, alleviate concerns, and create a plan going forward. If you need anything from us, don't hesitate to reach out.     If you develop any new or worsening symptoms that you believe need urgent or emergent evaluation, be sure to be reevaluated in urgent care or the emergency room.     Jeff Washington MD  Urgent Care

## 2025-07-31 ENCOUNTER — TELEPHONE (OUTPATIENT)
Dept: HEALTH INFORMATION MANAGEMENT | Facility: OTHER | Age: 65
End: 2025-07-31
Payer: MEDICARE

## 2025-08-16 SDOH — ECONOMIC STABILITY: FOOD INSECURITY: WITHIN THE PAST 12 MONTHS, YOU WORRIED THAT YOUR FOOD WOULD RUN OUT BEFORE YOU GOT MONEY TO BUY MORE.: NEVER TRUE

## 2025-08-16 SDOH — ECONOMIC STABILITY: INCOME INSECURITY: IN THE LAST 12 MONTHS, WAS THERE A TIME WHEN YOU WERE NOT ABLE TO PAY THE MORTGAGE OR RENT ON TIME?: NO

## 2025-08-16 SDOH — ECONOMIC STABILITY: TRANSPORTATION INSECURITY
IN THE PAST 12 MONTHS, HAS THE LACK OF TRANSPORTATION KEPT YOU FROM MEDICAL APPOINTMENTS OR FROM GETTING MEDICATIONS?: NO

## 2025-08-16 SDOH — ECONOMIC STABILITY: FOOD INSECURITY: WITHIN THE PAST 12 MONTHS, THE FOOD YOU BOUGHT JUST DIDN'T LAST AND YOU DIDN'T HAVE MONEY TO GET MORE.: NEVER TRUE

## 2025-08-16 SDOH — HEALTH STABILITY: PHYSICAL HEALTH: ON AVERAGE, HOW MANY DAYS PER WEEK DO YOU ENGAGE IN MODERATE TO STRENUOUS EXERCISE (LIKE A BRISK WALK)?: 7 DAYS

## 2025-08-16 SDOH — ECONOMIC STABILITY: TRANSPORTATION INSECURITY
IN THE PAST 12 MONTHS, HAS LACK OF RELIABLE TRANSPORTATION KEPT YOU FROM MEDICAL APPOINTMENTS, MEETINGS, WORK OR FROM GETTING THINGS NEEDED FOR DAILY LIVING?: NO

## 2025-08-16 SDOH — ECONOMIC STABILITY: INCOME INSECURITY: HOW HARD IS IT FOR YOU TO PAY FOR THE VERY BASICS LIKE FOOD, HOUSING, MEDICAL CARE, AND HEATING?: NOT HARD AT ALL

## 2025-08-16 SDOH — HEALTH STABILITY: PHYSICAL HEALTH: ON AVERAGE, HOW MANY MINUTES DO YOU ENGAGE IN EXERCISE AT THIS LEVEL?: 60 MIN

## 2025-08-16 SDOH — HEALTH STABILITY: MENTAL HEALTH
STRESS IS WHEN SOMEONE FEELS TENSE, NERVOUS, ANXIOUS, OR CAN'T SLEEP AT NIGHT BECAUSE THEIR MIND IS TROUBLED. HOW STRESSED ARE YOU?: NOT AT ALL

## 2025-08-16 SDOH — ECONOMIC STABILITY: HOUSING INSECURITY
IN THE LAST 12 MONTHS, WAS THERE A TIME WHEN YOU DID NOT HAVE A STEADY PLACE TO SLEEP OR SLEPT IN A SHELTER (INCLUDING NOW)?: NO

## 2025-08-16 SDOH — ECONOMIC STABILITY: TRANSPORTATION INSECURITY
IN THE PAST 12 MONTHS, HAS LACK OF TRANSPORTATION KEPT YOU FROM MEETINGS, WORK, OR FROM GETTING THINGS NEEDED FOR DAILY LIVING?: NO

## 2025-08-16 ASSESSMENT — SOCIAL DETERMINANTS OF HEALTH (SDOH)
HOW OFTEN DO YOU ATTENT MEETINGS OF THE CLUB OR ORGANIZATION YOU BELONG TO?: NEVER
DO YOU BELONG TO ANY CLUBS OR ORGANIZATIONS SUCH AS CHURCH GROUPS UNIONS, FRATERNAL OR ATHLETIC GROUPS, OR SCHOOL GROUPS?: NO
HOW OFTEN DO YOU ATTENT MEETINGS OF THE CLUB OR ORGANIZATION YOU BELONG TO?: NEVER
IN A TYPICAL WEEK, HOW MANY TIMES DO YOU TALK ON THE PHONE WITH FAMILY, FRIENDS, OR NEIGHBORS?: NEVER
HOW OFTEN DO YOU GET TOGETHER WITH FRIENDS OR RELATIVES?: NEVER
HOW OFTEN DO YOU GET TOGETHER WITH FRIENDS OR RELATIVES?: NEVER
HOW OFTEN DO YOU ATTEND CHURCH OR RELIGIOUS SERVICES?: NEVER
WITHIN THE PAST 12 MONTHS, YOU WORRIED THAT YOUR FOOD WOULD RUN OUT BEFORE YOU GOT THE MONEY TO BUY MORE: NEVER TRUE
IN THE PAST 12 MONTHS, HAS THE ELECTRIC, GAS, OIL, OR WATER COMPANY THREATENED TO SHUT OFF SERVICE IN YOUR HOME?: NO
HOW OFTEN DO YOU HAVE A DRINK CONTAINING ALCOHOL: MONTHLY OR LESS
HOW MANY DRINKS CONTAINING ALCOHOL DO YOU HAVE ON A TYPICAL DAY WHEN YOU ARE DRINKING: 1 OR 2
DO YOU BELONG TO ANY CLUBS OR ORGANIZATIONS SUCH AS CHURCH GROUPS UNIONS, FRATERNAL OR ATHLETIC GROUPS, OR SCHOOL GROUPS?: NO
HOW HARD IS IT FOR YOU TO PAY FOR THE VERY BASICS LIKE FOOD, HOUSING, MEDICAL CARE, AND HEATING?: NOT HARD AT ALL
HOW OFTEN DO YOU HAVE SIX OR MORE DRINKS ON ONE OCCASION: NEVER
IN A TYPICAL WEEK, HOW MANY TIMES DO YOU TALK ON THE PHONE WITH FAMILY, FRIENDS, OR NEIGHBORS?: NEVER
HOW OFTEN DO YOU ATTEND CHURCH OR RELIGIOUS SERVICES?: NEVER

## 2025-08-16 ASSESSMENT — LIFESTYLE VARIABLES
HOW OFTEN DO YOU HAVE A DRINK CONTAINING ALCOHOL: MONTHLY OR LESS
HOW OFTEN DO YOU HAVE SIX OR MORE DRINKS ON ONE OCCASION: NEVER
AUDIT-C TOTAL SCORE: 1
SKIP TO QUESTIONS 9-10: 1
HOW MANY STANDARD DRINKS CONTAINING ALCOHOL DO YOU HAVE ON A TYPICAL DAY: 1 OR 2

## 2025-08-19 ENCOUNTER — OFFICE VISIT (OUTPATIENT)
Dept: MEDICAL GROUP | Facility: MEDICAL CENTER | Age: 65
End: 2025-08-19
Payer: MEDICARE

## 2025-08-19 VITALS
SYSTOLIC BLOOD PRESSURE: 118 MMHG | BODY MASS INDEX: 28.58 KG/M2 | HEART RATE: 59 BPM | HEIGHT: 69 IN | WEIGHT: 193 LBS | OXYGEN SATURATION: 97 % | DIASTOLIC BLOOD PRESSURE: 52 MMHG | TEMPERATURE: 98.1 F

## 2025-08-19 DIAGNOSIS — Z00.00 ENCOUNTER FOR MEDICAL EXAMINATION TO ESTABLISH CARE: ICD-10-CM

## 2025-08-19 DIAGNOSIS — M54.41 CHRONIC RIGHT-SIDED LOW BACK PAIN WITH RIGHT-SIDED SCIATICA: ICD-10-CM

## 2025-08-19 DIAGNOSIS — K63.5 POLYP OF COLON, UNSPECIFIED PART OF COLON, UNSPECIFIED TYPE: ICD-10-CM

## 2025-08-19 DIAGNOSIS — Z11.59 NEED FOR HEPATITIS C SCREENING TEST: ICD-10-CM

## 2025-08-19 DIAGNOSIS — G47.33 OSA (OBSTRUCTIVE SLEEP APNEA): ICD-10-CM

## 2025-08-19 DIAGNOSIS — E78.5 DYSLIPIDEMIA: ICD-10-CM

## 2025-08-19 DIAGNOSIS — G89.29 CHRONIC RIGHT-SIDED LOW BACK PAIN WITH RIGHT-SIDED SCIATICA: ICD-10-CM

## 2025-08-19 DIAGNOSIS — Z23 IMMUNIZATION DUE: ICD-10-CM

## 2025-08-19 DIAGNOSIS — I77.74 VERTEBRAL ARTERY DISSECTION (HCC): ICD-10-CM

## 2025-08-19 DIAGNOSIS — Z12.5 ENCOUNTER FOR SCREENING FOR MALIGNANT NEOPLASM OF PROSTATE: ICD-10-CM

## 2025-08-19 DIAGNOSIS — Z13.1 SCREENING FOR DIABETES MELLITUS: ICD-10-CM

## 2025-08-19 DIAGNOSIS — I10 HYPERTENSION, UNSPECIFIED TYPE: ICD-10-CM

## 2025-08-19 PROCEDURE — 3078F DIAST BP <80 MM HG: CPT | Performed by: STUDENT IN AN ORGANIZED HEALTH CARE EDUCATION/TRAINING PROGRAM

## 2025-08-19 PROCEDURE — 3074F SYST BP LT 130 MM HG: CPT | Performed by: STUDENT IN AN ORGANIZED HEALTH CARE EDUCATION/TRAINING PROGRAM

## 2025-08-19 PROCEDURE — G0009 ADMIN PNEUMOCOCCAL VACCINE: HCPCS | Performed by: STUDENT IN AN ORGANIZED HEALTH CARE EDUCATION/TRAINING PROGRAM

## 2025-08-19 PROCEDURE — 99204 OFFICE O/P NEW MOD 45 MIN: CPT | Mod: 25 | Performed by: STUDENT IN AN ORGANIZED HEALTH CARE EDUCATION/TRAINING PROGRAM

## 2025-08-19 PROCEDURE — 90677 PCV20 VACCINE IM: CPT | Performed by: STUDENT IN AN ORGANIZED HEALTH CARE EDUCATION/TRAINING PROGRAM

## 2025-08-19 RX ORDER — LISINOPRIL 20 MG/1
20 TABLET ORAL DAILY
Qty: 100 TABLET | Refills: 3 | Status: SHIPPED | OUTPATIENT
Start: 2025-08-19

## 2025-08-19 RX ORDER — ACYCLOVIR 400 MG/1
400 TABLET ORAL 3 TIMES DAILY
COMMUNITY

## 2025-08-19 RX ORDER — SIMVASTATIN 10 MG
10 TABLET ORAL NIGHTLY
Qty: 100 TABLET | Refills: 3 | Status: SHIPPED | OUTPATIENT
Start: 2025-08-19

## 2025-08-19 RX ORDER — AMLODIPINE BESYLATE 10 MG/1
10 TABLET ORAL
Qty: 100 TABLET | Refills: 3 | Status: SHIPPED | OUTPATIENT
Start: 2025-08-19

## 2025-08-19 ASSESSMENT — PATIENT HEALTH QUESTIONNAIRE - PHQ9: CLINICAL INTERPRETATION OF PHQ2 SCORE: 0
